# Patient Record
Sex: MALE | Race: WHITE
[De-identification: names, ages, dates, MRNs, and addresses within clinical notes are randomized per-mention and may not be internally consistent; named-entity substitution may affect disease eponyms.]

---

## 2018-10-25 ENCOUNTER — HOSPITAL ENCOUNTER (OUTPATIENT)
Dept: HOSPITAL 91 - GIL | Age: 53
Discharge: HOME | End: 2018-10-25
Payer: COMMERCIAL

## 2018-10-25 ENCOUNTER — HOSPITAL ENCOUNTER (OUTPATIENT)
Age: 53
Discharge: HOME | End: 2018-10-25

## 2018-10-25 DIAGNOSIS — D12.6: ICD-10-CM

## 2018-10-25 DIAGNOSIS — Z12.11: Primary | ICD-10-CM

## 2018-10-25 PROCEDURE — 45380 COLONOSCOPY AND BIOPSY: CPT

## 2018-10-25 PROCEDURE — 88305 TISSUE EXAM BY PATHOLOGIST: CPT

## 2019-04-10 ENCOUNTER — HOSPITAL ENCOUNTER (INPATIENT)
Dept: HOSPITAL 10 - E/R | Age: 54
LOS: 3 days | Discharge: HOME | DRG: 101 | End: 2019-04-13
Attending: INTERNAL MEDICINE | Admitting: INTERNAL MEDICINE
Payer: COMMERCIAL

## 2019-04-10 ENCOUNTER — HOSPITAL ENCOUNTER (INPATIENT)
Dept: HOSPITAL 91 - 6WM | Age: 54
LOS: 3 days | Discharge: HOME | DRG: 101 | End: 2019-04-13
Payer: COMMERCIAL

## 2019-04-10 VITALS
WEIGHT: 173.72 LBS | HEIGHT: 72 IN | BODY MASS INDEX: 23.53 KG/M2 | BODY MASS INDEX: 23.53 KG/M2 | HEIGHT: 72 IN | WEIGHT: 173.72 LBS

## 2019-04-10 VITALS — HEART RATE: 89 BPM | SYSTOLIC BLOOD PRESSURE: 141 MMHG | DIASTOLIC BLOOD PRESSURE: 89 MMHG | RESPIRATION RATE: 19 BRPM

## 2019-04-10 VITALS — RESPIRATION RATE: 18 BRPM | HEART RATE: 92 BPM | DIASTOLIC BLOOD PRESSURE: 82 MMHG | SYSTOLIC BLOOD PRESSURE: 158 MMHG

## 2019-04-10 VITALS — DIASTOLIC BLOOD PRESSURE: 86 MMHG | SYSTOLIC BLOOD PRESSURE: 158 MMHG | HEART RATE: 86 BPM | RESPIRATION RATE: 18 BRPM

## 2019-04-10 DIAGNOSIS — F14.90: ICD-10-CM

## 2019-04-10 DIAGNOSIS — Z99.3: ICD-10-CM

## 2019-04-10 DIAGNOSIS — F51.04: ICD-10-CM

## 2019-04-10 DIAGNOSIS — G47.00: ICD-10-CM

## 2019-04-10 DIAGNOSIS — I69.954: ICD-10-CM

## 2019-04-10 DIAGNOSIS — F39: ICD-10-CM

## 2019-04-10 DIAGNOSIS — F41.9: ICD-10-CM

## 2019-04-10 DIAGNOSIS — J32.9: ICD-10-CM

## 2019-04-10 DIAGNOSIS — G93.49: ICD-10-CM

## 2019-04-10 DIAGNOSIS — G40.909: Primary | ICD-10-CM

## 2019-04-10 DIAGNOSIS — K50.90: ICD-10-CM

## 2019-04-10 LAB
ADD MAN DIFF?: NO
ADD UMIC: YES
ALANINE AMINOTRANSFERASE: 16 IU/L (ref 13–69)
ALBUMIN/GLOBULIN RATIO: 1.22
ALBUMIN: 4.3 G/DL (ref 3.3–4.9)
ALKALINE PHOSPHATASE: 159 IU/L (ref 42–121)
ANION GAP: 12 (ref 5–13)
ANION GAP: 14 (ref 5–13)
ASPARTATE AMINO TRANSFERASE: 20 IU/L (ref 15–46)
BASOPHIL #: 0 10^3/UL (ref 0–0.1)
BASOPHILS %: 0.2 % (ref 0–2)
BILIRUBIN,DIRECT: 0 MG/DL (ref 0–0.2)
BILIRUBIN,TOTAL: 0.5 MG/DL (ref 0.2–1.3)
BLOOD UREA NITROGEN: 11 MG/DL (ref 7–20)
BLOOD UREA NITROGEN: 12 MG/DL (ref 7–20)
C-REACTIVE PROTEIN: 1.4 MG/DL (ref 0–0.9)
CALCIUM: 10.1 MG/DL (ref 8.4–10.2)
CALCIUM: 9.3 MG/DL (ref 8.4–10.2)
CARBON DIOXIDE: 25 MMOL/L (ref 21–31)
CARBON DIOXIDE: 26 MMOL/L (ref 21–31)
CHLORIDE: 104 MMOL/L (ref 97–110)
CHLORIDE: 104 MMOL/L (ref 97–110)
CREATININE: 0.8 MG/DL (ref 0.61–1.24)
CREATININE: 0.84 MG/DL (ref 0.61–1.24)
EOSINOPHILS #: 0 10^3/UL (ref 0–0.5)
EOSINOPHILS %: 0.1 % (ref 0–7)
GLOBULIN: 3.5 G/DL (ref 1.3–3.2)
GLUCOSE: 106 MG/DL (ref 70–220)
GLUCOSE: 106 MG/DL (ref 70–220)
HEMATOCRIT: 55.4 % (ref 42–52)
HEMOGLOBIN: 18.3 G/DL (ref 14–18)
IMMATURE GRANS #M: 0.03 10^3/UL (ref 0–0.03)
IMMATURE GRANS % (M): 0.3 % (ref 0–0.43)
INR: 1
LACTIC ACID: 0.9 MMOL/L (ref 0.5–2)
LACTIC ACID: 1.6 MMOL/L (ref 0.5–2)
LIPASE: 232 U/L (ref 23–300)
LYMPHOCYTES #: 0.8 10^3/UL (ref 0.8–2.9)
LYMPHOCYTES %: 7.7 % (ref 15–51)
MEAN CORPUSCULAR HEMOGLOBIN: 31.3 PG (ref 29–33)
MEAN CORPUSCULAR HGB CONC: 33 G/DL (ref 32–37)
MEAN CORPUSCULAR VOLUME: 94.7 FL (ref 82–101)
MEAN PLATELET VOLUME: 9.4 FL (ref 7.4–10.4)
MONOCYTE #: 0.6 10^3/UL (ref 0.3–0.9)
MONOCYTES %: 6.4 % (ref 0–11)
NEUTROPHIL #: 8.6 10^3/UL (ref 1.6–7.5)
NEUTROPHILS %: 85.3 % (ref 39–77)
NUCLEATED RED BLOOD CELLS #: 0 10^3/UL (ref 0–0)
NUCLEATED RED BLOOD CELLS%: 0 /100WBC (ref 0–0)
PARTIAL THROMBOPLASTIN TIME: 28.2 SEC (ref 23–35)
PLATELET COUNT: 270 10^3/UL (ref 140–415)
POTASSIUM: 3.8 MMOL/L (ref 3.5–5.1)
POTASSIUM: 4.5 MMOL/L (ref 3.5–5.1)
PROTIME: 13.3 SEC (ref 11.9–14.9)
PT RATIO: 1
RED BLOOD COUNT: 5.85 10^6/UL (ref 4.7–6.1)
RED CELL DISTRIBUTION WIDTH: 12.3 % (ref 11.5–14.5)
SODIUM: 141 MMOL/L (ref 135–144)
SODIUM: 144 MMOL/L (ref 135–144)
TOTAL PROTEIN: 7.8 G/DL (ref 6.1–8.1)
UR ASCORBIC ACID: NEGATIVE MG/DL
UR BILIRUBIN (DIP): NEGATIVE MG/DL
UR BLOOD (DIP): (no result) MG/DL
UR CLARITY: (no result)
UR COLOR: YELLOW
UR GLUCOSE (DIP): NEGATIVE MG/DL
UR KETONES (DIP): (no result) MG/DL
UR LEUKOCYTE ESTERASE (DIP): NEGATIVE LEU/UL
UR MUCUS: (no result) /HPF
UR NITRITE (DIP): NEGATIVE MG/DL
UR PH (DIP): 5 (ref 5–9)
UR RBC: > 182 /HPF (ref 0–5)
UR SPECIFIC GRAVITY (DIP): 1.02 (ref 1–1.03)
UR TOTAL PROTEIN (DIP): (no result) MG/DL
UR UROBILINOGEN (DIP): NEGATIVE MG/DL
UR WBC: 5 /HPF (ref 0–5)
WHITE BLOOD COUNT: 10 10^3/UL (ref 4.8–10.8)

## 2019-04-10 PROCEDURE — 80053 COMPREHEN METABOLIC PANEL: CPT

## 2019-04-10 PROCEDURE — 87081 CULTURE SCREEN ONLY: CPT

## 2019-04-10 PROCEDURE — 96365 THER/PROPH/DIAG IV INF INIT: CPT

## 2019-04-10 PROCEDURE — 81001 URINALYSIS AUTO W/SCOPE: CPT

## 2019-04-10 PROCEDURE — 83690 ASSAY OF LIPASE: CPT

## 2019-04-10 PROCEDURE — 85730 THROMBOPLASTIN TIME PARTIAL: CPT

## 2019-04-10 PROCEDURE — 96367 TX/PROPH/DG ADDL SEQ IV INF: CPT

## 2019-04-10 PROCEDURE — 70551 MRI BRAIN STEM W/O DYE: CPT

## 2019-04-10 PROCEDURE — 84100 ASSAY OF PHOSPHORUS: CPT

## 2019-04-10 PROCEDURE — 80307 DRUG TEST PRSMV CHEM ANLYZR: CPT

## 2019-04-10 PROCEDURE — 82550 ASSAY OF CK (CPK): CPT

## 2019-04-10 PROCEDURE — 82962 GLUCOSE BLOOD TEST: CPT

## 2019-04-10 PROCEDURE — 96375 TX/PRO/DX INJ NEW DRUG ADDON: CPT

## 2019-04-10 PROCEDURE — 85025 COMPLETE CBC W/AUTO DIFF WBC: CPT

## 2019-04-10 PROCEDURE — 83036 HEMOGLOBIN GLYCOSYLATED A1C: CPT

## 2019-04-10 PROCEDURE — 87086 URINE CULTURE/COLONY COUNT: CPT

## 2019-04-10 PROCEDURE — 84439 ASSAY OF FREE THYROXINE: CPT

## 2019-04-10 PROCEDURE — 83605 ASSAY OF LACTIC ACID: CPT

## 2019-04-10 PROCEDURE — 85610 PROTHROMBIN TIME: CPT

## 2019-04-10 PROCEDURE — 87400 INFLUENZA A/B EACH AG IA: CPT

## 2019-04-10 PROCEDURE — 80048 BASIC METABOLIC PNL TOTAL CA: CPT

## 2019-04-10 PROCEDURE — 86140 C-REACTIVE PROTEIN: CPT

## 2019-04-10 PROCEDURE — 80061 LIPID PANEL: CPT

## 2019-04-10 PROCEDURE — 93306 TTE W/DOPPLER COMPLETE: CPT

## 2019-04-10 PROCEDURE — 83735 ASSAY OF MAGNESIUM: CPT

## 2019-04-10 PROCEDURE — 92610 EVALUATE SWALLOWING FUNCTION: CPT

## 2019-04-10 PROCEDURE — 36415 COLL VENOUS BLD VENIPUNCTURE: CPT

## 2019-04-10 PROCEDURE — 70450 CT HEAD/BRAIN W/O DYE: CPT

## 2019-04-10 PROCEDURE — 84443 ASSAY THYROID STIM HORMONE: CPT

## 2019-04-10 PROCEDURE — 99285 EMERGENCY DEPT VISIT HI MDM: CPT

## 2019-04-10 PROCEDURE — 87040 BLOOD CULTURE FOR BACTERIA: CPT

## 2019-04-10 PROCEDURE — 71045 X-RAY EXAM CHEST 1 VIEW: CPT

## 2019-04-10 PROCEDURE — 95819 EEG AWAKE AND ASLEEP: CPT

## 2019-04-10 PROCEDURE — 93880 EXTRACRANIAL BILAT STUDY: CPT

## 2019-04-10 RX ADMIN — LORAZEPAM 1 MG: 2 INJECTION, SOLUTION INTRAMUSCULAR; INTRAVENOUS at 20:45

## 2019-04-10 RX ADMIN — CEFEPIME 1 MLS/HR: 1 INJECTION, POWDER, FOR SOLUTION INTRAMUSCULAR; INTRAVENOUS at 17:14

## 2019-04-10 RX ADMIN — LORAZEPAM 1 MG: 2 INJECTION, SOLUTION INTRAMUSCULAR; INTRAVENOUS at 15:59

## 2019-04-10 RX ADMIN — CEFEPIME 1 MLS/HR: 1 INJECTION, POWDER, FOR SOLUTION INTRAMUSCULAR; INTRAVENOUS at 20:21

## 2019-04-10 RX ADMIN — VANCOMYCIN HYDROCHLORIDE 1 MLS/HR: 1 INJECTION, POWDER, LYOPHILIZED, FOR SOLUTION INTRAVENOUS at 17:47

## 2019-04-10 RX ADMIN — LEVETIRACETAM SCH MLS/HR: 10 INJECTION INTRAVENOUS at 20:21

## 2019-04-10 RX ADMIN — THIAMINE HYDROCHLORIDE 1 ML: 100 INJECTION, SOLUTION INTRAMUSCULAR; INTRAVENOUS at 17:13

## 2019-04-10 RX ADMIN — ACETAMINOPHEN 1 MG: 325 TABLET, FILM COATED ORAL at 20:11

## 2019-04-10 RX ADMIN — VANCOMYCIN HYDROCHLORIDE 1 MLS/HR: 500 INJECTION, POWDER, LYOPHILIZED, FOR SOLUTION INTRAVENOUS at 22:59

## 2019-04-10 RX ADMIN — LEVETIRACETAM 1 MLS/HR: 10 INJECTION INTRAVENOUS at 20:21

## 2019-04-10 RX ADMIN — IBUPROFEN 1 MG: 600 TABLET ORAL at 17:14

## 2019-04-10 RX ADMIN — CEFEPIME SCH MLS/HR: 1 INJECTION, POWDER, FOR SOLUTION INTRAMUSCULAR; INTRAVENOUS at 20:21

## 2019-04-10 RX ADMIN — LEVETIRACETAM 1 MLS/HR: 10 INJECTION INTRAVENOUS at 13:06

## 2019-04-10 RX ADMIN — THIAMINE HYDROCHLORIDE 1 MLS/HR: 100 INJECTION, SOLUTION INTRAMUSCULAR; INTRAVENOUS at 20:10

## 2019-04-10 RX ADMIN — ENALAPRILAT 1 MG: 1.25 INJECTION INTRAVENOUS at 14:46

## 2019-04-10 RX ADMIN — FOLIC ACID SCH MLS/HR: 5 INJECTION, SOLUTION INTRAMUSCULAR; INTRAVENOUS; SUBCUTANEOUS at 20:10

## 2019-04-10 NOTE — HP
Date/Time of Note


Date/Time of Note


DATE: 4/10/19 


TIME: 18:30





Assessment/Plan


VTE Prophylaxis


Pharmacological prophylaxis:  other





Lines/Catheters


IV Catheter Type (from [facility]):  Saline Lock





Assessment/Plan


Hospital Course


Patient is a  male with a past medical history significant for CVA with


residual left-sided deficit, Crohn's, being wheelchair-bound who presents to 


[facility] after being sent by his assisted living facility 


after being found down, altered, with his tongue cut and mild bleeding from the 


mouth.  Patient originally was assessed by ER physician diagnosed with likely 


new onset seizure secondary to his prior CVA and was about to be discharged when


lactic acid was elevated as well as a fever.  Currently patient is not 


consistent with what the assisted living facility states, according to living 


facility, patient is alert and oriented and able to take care of himself for the


most part, he does have a left-sided deficit but able to we will him  self it is


wheelchair, it is unknown at this time if the left-sided deficit included a mild


left upper arm deficit and not just the leg.  Patient denies any history of 


seizure and he is able to answer some questions somewhat.  Patient cannot keep 


his concentration for quite some time.  Patient cannot accurately explain what 


exactly happened according to assisted living facility staff last known well 


time was last night.  Patient currently denies any chest pain, shortness of 


breath however HPI is limited as patient is still mildly altered.





Objective





Physical exam





General: Patient is laying in bed and answers questions very slowly and 


intermittently


Mentation: Patient is alert and oriented to self


Head: Normocephalic atraumatic


Eyes: EOMI, pupils reactive to light


Neck: Supple, nontender, midline


Respiratory: Clear to auscultation bilaterally


Cardiovascular: regular rate, no obvious murmurs


Gastrointestinal: non-tender to palpation, bowel sounds heard.


Neurological: Left upper extremity is contracted however is easily straightened 


but returns to the contracted state, left lower extremity has minimal movement 


but there is some movement in the toes with sensation.  Right upper extremity 


and right lower extremity have full movement and sensation


Skin: No new skin lesions,





Assessment and plan





Acute encephalopathy


-The differential is extremely wide as there appears to be limited history, 


seizure versus CVA versus dehydration versus UTI as UA has not been collected 


versus other infectious sources


-Patient's hemoglobin is consistent with volume contraction, give IV fluids


-IV antibiotics broad-spectrum for fever and lactic acid elevation


-CT negative for acute CVA, MRI pending


-Keppra on board for possible seizure with Ativan as needed, neurology 


consulted,


-Blood cultures pending


-UA pending


-Drug screen pending


-EEG pending





Left upper extremity and left lower extremity weakness and contraction of the 


upper extremity


-According to staff at assisted living facility, patient does have a left-sided 


deficit however it is unknown at this time if the left-sided deficit includes 


the upper extremity, it is known patient is in wheelchair and is able to we will


him self around so doubt left upper extremity contracture is chronic however it 


may also have a possible chronic nature as patient's only medication from the 


assisted living facility was baclofen.


-We will need to speak with patient more once he is less encephalopathic


-CT negative for acute CVA


-MRI pending





Syncope


-Patient found down by staff at living facility,, unknown true downtime


-CT negative for acute issues, old CVA, MRI pending


-Echocardiogram


-Carotid ultrasound





Sepsis, unknown source, possible UTI


-Broad-spectrum antibiotic


-Blood cultures


-IV fluids





Elevated hemoglobin


-Possible volume contraction due to volume depletion


-Monitor labs in a.m.





Questionable seizure


-This may be the culprit however unknown


-Neurology consulted


-Continue Keppra for now


-Patient was not on Keppra at the assisted living facility





Disposition


-There are many unknown and uncertain questions at this time, will approach 


everything in a broad fashion and narrow down treatment as more information 


arises.  Monitor closely on telemetry, patient is n.p.o. at this time as his 


mentation is not completely certain.


Result Diagram:  


4/10/19 1055                                                                    


           4/10/19 1055





Results 24hrs





Laboratory Tests


Test
                                4/10/19
10:55  4/10/19
10:57  4/10/19
17:04


White Blood Count                           10.0


Red Blood Count                             5.85


Hemoglobin                                 18.3  H


Hematocrit                                 55.4  H


Mean Corpuscular Volume                     94.7


Mean Corpuscular Hemoglobin                 31.3


Mean Corpuscular Hemoglobin
Concent        33.0  
  
              



Red Cell Distribution Width                 12.3


Platelet Count                               270


Mean Platelet Volume                         9.4


Immature Granulocytes %                    0.300


Neutrophils %                              85.3  H


Lymphocytes %                               7.7  L


Monocytes %                                  6.4


Eosinophils %                                0.1


Basophils %                                  0.2


Nucleated Red Blood Cells %                  0.0


Immature Granulocytes #                    0.030


Neutrophils #                               8.6  H


Lymphocytes #                                0.8


Monocytes #                                  0.6


Eosinophils #                                0.0


Basophils #                                  0.0


Nucleated Red Blood Cells #                  0.0


Sodium Level                                 144


Potassium Level                              3.8


Chloride Level                               104


Carbon Dioxide Level                          26


Anion Gap                                    14  H


Blood Urea Nitrogen                           12


Creatinine                                  0.84


Est Glomerular Filtrat Rate
mL/min   > 60  
        
              



Glucose Level                                106


Calcium Level                               10.1


Bedside Glucose                                              98


POC Venous Lactate                                                       2.1  *H








HPI/ROS


Admit Date/Time


Admit Date/Time








PMH/Family/Social


Past Medical History


Medications





Current Medications


Vancomycin HCl 250 ml @  125 mls/hr ONCE  ONCE IVPB ;  Start 4/10/19 at 17:00;  


Stop 4/10/19 at 18:59


Coded Allergies:  


     Iodine and Iodide Containing Produc (Verified  Allergy, Severe, HIVES, 


RASH, ITCHING, 4/10/19)





Social History


Smoking Status:  Never smoker





Exam/Review of Systems


Vital Signs


Vitals





Vital Signs


  Date      Temp   Pulse  Resp  B/P (MAP)   Pulse Ox  O2         O2 Flow    FiO2


Time                                                  Delivery   Rate


   4/10/19  100.7


     17:14


   4/10/19           102    18      160/96        95  Room Air


     16:15                           (117)














CARMENCITA CHILEL                    Apr 10, 2019 18:30

## 2019-04-10 NOTE — ERD
ER Documentation


Chief Complaint


Chief Complaint





JOSÉ GOMEZ OF AL. ORAL TRAUMA. NON VERBAL





HPI


53-year-old male transferred to the emergency department by paramedics after a 


loss of consciousness episode.  Patient is a poor historian with limited insight


into his presentation.


According to the paramedics, there was a brief loss of consciousness episode.  


There is no witnessed seizure activity, but the patient had oral trauma and 


incontinence.  He appeared to be postictal and was transported to the emergency 


department.  There is no history of headache or any other symptoms prior to the 


event.





I have reviewed the paramedic pre-hospital care.


Pre-hospital vital signs were reviewed. 


Pre-hospital diagnostic tests were reviewed.





Upon arrival, patient is unable to provide any further history.





ROS


All systems reviewed and are negative except as per history of present illness.





Medications


Home Meds


Active Scripts


Levetiracetam* (Keppra*) 500 Mg Tablet, 500 MG PO BID, #30 TAB


   Prov:BRAYDEN ARROYO         4/10/19


Reported Medications


[Clonazepam]   No Conflict Check


   10/25/18


[Baclofen]   No Conflict Check


   10/25/18





Allergies


Allergies:  


Coded Allergies:  


     Iodine and Iodide Containing Produc (Verified  Allergy, Severe, HIVES, 


RASH, ITCHING, 10/25/18)





PMhx/Soc


History of Surgery:  Yes (SB RESECTION)


Anesthesia Reaction:  No


Hx Neurological Disorder:  Yes (CVA WITH LEFT HEMIPARESIS)


Hx Respiratory Disorders:  No


Hx Cardiac Disorders:  No


Hx Psychiatric Problems:  Yes (INSOMNIA, anxiety)


Hx Miscellaneous Medical Probl:  No (Chron's Disease)


Hx Alcohol Use:  No


Hx Substance Use:  No


Hx Tobacco Use:  No


Smoking Status:  Never smoker





Physical Exam


Vitals





Vital Signs


  Date      Temp  Pulse  Resp  B/P (MAP)   Pulse Ox  O2          O2 Flow    FiO2


Time                                                 Delivery    Rate


   4/10/19  97.8     89    16     150/100        99


     10:38                          (117)





Physical Exam


GENERAL: Chronically ill-appearing L in no distress


HEENT: Pupils equal, round, and reactive to light.  EOMI. There is no scleral 


icterus.  Oral trauma is noted


NECK: C-spine is soft and supple, there is no meningismus.  There is no cervical


 lymphadenopathy.


LUNGS: Clear to auscultation bilaterally. There are no rales, wheezes or 


rhonchi.


HEART: Regular rate and rhythm, no murmurs, clicks, rubs or gallops.


ABDOMEN: Soft, non-tender, non-distended.  There are bowel sounds in all four 


quadrants. No rebound or guarding.


EXTREMITIES: No edema, cyanosis or clubbing


NEURO:   Patient is awake but postictal.  He is slow to respond with limited 


insight.  Pupils are midrange, equal round and reactive to light.  Patient has 


left upper extremity and lower extremity weakness consistent with a previous 


stroke that he has had as per his records.


SKIN: There is no apparent rash or petechiae.


HEME/LYMPHATIC: There is no evidence of excessive bruising or lymphedema.


PSYCHIATRIC: The patient does not appear anxious or depressed.  Appears 


postictal


Result Diagram:  


4/10/19 1055                                                                    


            4/10/19 1055





Results 24 hrs





Laboratory Tests


       Test
                                 4/10/19
10:55  4/10/19
10:57


       White Blood Count                     10.0 10^3/ul


       Red Blood Count                       5.85 10^6/ul


       Hemoglobin                               18.3 g/dl


       Hematocrit                                  55.4 %


       Mean Corpuscular Volume                    94.7 fl


       Mean Corpuscular Hemoglobin                31.3 pg


       Mean Corpuscular Hemoglobin
Concent     33.0 g/dl 
  



       Red Cell Distribution Width                 12.3 %


       Platelet Count                         270 10^3/UL


       Mean Platelet Volume                        9.4 fl


       Immature Granulocytes %                    0.300 %


       Neutrophils %                               85.3 %


       Lymphocytes %                                7.7 %


       Monocytes %                                  6.4 %


       Eosinophils %                                0.1 %


       Basophils %                                  0.2 %


       Nucleated Red Blood Cells %            0.0 /100WBC


       Immature Granulocytes #              0.030 10^3/ul


       Neutrophils #                          8.6 10^3/ul


       Lymphocytes #                          0.8 10^3/ul


       Monocytes #                            0.6 10^3/ul


       Eosinophils #                          0.0 10^3/ul


       Basophils #                            0.0 10^3/ul


       Nucleated Red Blood Cells #            0.0 10^3/ul


       Sodium Level                            144 mmol/L


       Potassium Level                         3.8 mmol/L


       Chloride Level                          104 mmol/L


       Carbon Dioxide Level                     26 mmol/L


       Anion Gap                                       14


       Blood Urea Nitrogen                       12 mg/dl


       Creatinine                              0.84 mg/dl


       Est Glomerular Filtrat Rate
mL/min   > 60 mL/min 
   



       Glucose Level                            106 mg/dl


       Calcium Level                           10.1 mg/dl


       Bedside Glucose                                          98 mg/dL





Current Medications


 Medications
   Dose
          Sig/Kim
       Start Time
   Status  Last


 (Trade)       Ordered        Route
 PRN     Stop Time              Admin
Dose


                              Reason                                Admin


                100 ml @ 
     ONCE  ONCE
    4/10/19                



Levetiracetam  400 mls/hr     IVPB
          13:00



                                             4/10/19 13:14








Procedures/MDM


Patient was taken to a room, seen and evaluated. Comfort measures were 


initiated. 





Diagnostic tests were ordered and reviewed.





3 LEAD RHYTHM STRIP: Normal sinus rhythm without ectopy





RADIOLOGY: Reviewed with the radiologist





REEVALUATION: 1300: Upon reevaluation, patient is now more awake but with no 


significant insight.  He continues to have his left upper extremity weakness 


consistent with his previous stroke as per his documentation from his care 


facility.  There are no new neurologic symptoms appreciated.





MEDICAL DECISION MAKIN-year-old male presents after what appears to be a 


seizure.  He does have a history of a previous stroke which would be the focus 


for the seizure.  At this time, he appears to be back to his baseline neurologic


 state with no further seizure activity after an observation time of 


approximately 1 hour.  Overall, patient otherwise appears to be stable with no 


other indications of other high-risk concerns and seems appropriate for 


discharge.





Departure


Diagnosis:  


   Primary Impression:  


   Seizure


Condition:  Stable


Patient Instructions:  Seizure, New Onset, Unk Cause [Adult]





Additional Instructions:  


See your doctor for follow-up as discussed.  Take a copy of your test results, 


if appropriate, to this follow-up visit.





See your doctor or return here if your symptoms do not improve as expected.





At any time, please return to the emergency department for any change or 


worsening in her symptoms.











BRAYDEN ARROYO                Apr 10, 2019 13:02

## 2019-04-11 VITALS — SYSTOLIC BLOOD PRESSURE: 142 MMHG | RESPIRATION RATE: 18 BRPM | DIASTOLIC BLOOD PRESSURE: 83 MMHG | HEART RATE: 89 BPM

## 2019-04-11 VITALS — HEART RATE: 99 BPM

## 2019-04-11 VITALS — HEART RATE: 80 BPM | SYSTOLIC BLOOD PRESSURE: 139 MMHG | RESPIRATION RATE: 20 BRPM | DIASTOLIC BLOOD PRESSURE: 81 MMHG

## 2019-04-11 VITALS — RESPIRATION RATE: 22 BRPM | HEART RATE: 82 BPM | DIASTOLIC BLOOD PRESSURE: 92 MMHG | SYSTOLIC BLOOD PRESSURE: 149 MMHG

## 2019-04-11 VITALS — RESPIRATION RATE: 18 BRPM | HEART RATE: 81 BPM | DIASTOLIC BLOOD PRESSURE: 69 MMHG | SYSTOLIC BLOOD PRESSURE: 110 MMHG

## 2019-04-11 VITALS — HEART RATE: 96 BPM | DIASTOLIC BLOOD PRESSURE: 77 MMHG | RESPIRATION RATE: 18 BRPM | SYSTOLIC BLOOD PRESSURE: 110 MMHG

## 2019-04-11 VITALS — HEART RATE: 81 BPM

## 2019-04-11 VITALS — HEART RATE: 84 BPM

## 2019-04-11 VITALS — HEART RATE: 83 BPM | SYSTOLIC BLOOD PRESSURE: 118 MMHG | RESPIRATION RATE: 18 BRPM | DIASTOLIC BLOOD PRESSURE: 71 MMHG

## 2019-04-11 VITALS — HEART RATE: 89 BPM

## 2019-04-11 VITALS — HEART RATE: 77 BPM

## 2019-04-11 VITALS — SYSTOLIC BLOOD PRESSURE: 128 MMHG | HEART RATE: 81 BPM | DIASTOLIC BLOOD PRESSURE: 73 MMHG | RESPIRATION RATE: 18 BRPM

## 2019-04-11 VITALS — HEART RATE: 86 BPM

## 2019-04-11 LAB
ADD MAN DIFF?: NO
ALANINE AMINOTRANSFERASE: 17 IU/L (ref 13–69)
ALBUMIN/GLOBULIN RATIO: 1.2
ALBUMIN: 3.6 G/DL (ref 3.3–4.9)
ALKALINE PHOSPHATASE: 131 IU/L (ref 42–121)
ANION GAP: 8 (ref 5–13)
ASPARTATE AMINO TRANSFERASE: 18 IU/L (ref 15–46)
BASOPHIL #: 0 10^3/UL (ref 0–0.1)
BASOPHILS %: 0.4 % (ref 0–2)
BILIRUBIN,DIRECT: 0 MG/DL (ref 0–0.2)
BILIRUBIN,TOTAL: 0.8 MG/DL (ref 0.2–1.3)
BLOOD UREA NITROGEN: 13 MG/DL (ref 7–20)
CALCIUM: 8.8 MG/DL (ref 8.4–10.2)
CARBON DIOXIDE: 27 MMOL/L (ref 21–31)
CHLORIDE: 105 MMOL/L (ref 97–110)
CHOL/HDL RATIO: 2.6 RATIO
CHOLESTEROL: 93 MG/DL (ref 100–200)
CREATININE: 0.76 MG/DL (ref 0.61–1.24)
EOSINOPHILS #: 0 10^3/UL (ref 0–0.5)
EOSINOPHILS %: 0.3 % (ref 0–7)
FREE T4 (FREE THYROXINE): 0.84 NG/DL (ref 0.64–1.79)
GLOBULIN: 3 G/DL (ref 1.3–3.2)
GLUCOSE: 88 MG/DL (ref 70–220)
HDL CHOLESTEROL: 35 MG/DL (ref 28–71)
HEMATOCRIT: 44.4 % (ref 42–52)
HEMOGLOBIN A1C: 5 % (ref 0–5.9)
HEMOGLOBIN: 14.6 G/DL (ref 14–18)
IMMATURE GRANS #M: 0.02 10^3/UL (ref 0–0.03)
IMMATURE GRANS % (M): 0.3 % (ref 0–0.43)
LDL CHOLESTEROL,CALCULATED: 40 MG/DL
LYMPHOCYTES #: 1.4 10^3/UL (ref 0.8–2.9)
LYMPHOCYTES %: 19.3 % (ref 15–51)
MAGNESIUM: 2.2 MG/DL (ref 1.7–2.5)
MEAN CORPUSCULAR HEMOGLOBIN: 31.5 PG (ref 29–33)
MEAN CORPUSCULAR HGB CONC: 32.9 G/DL (ref 32–37)
MEAN CORPUSCULAR VOLUME: 95.7 FL (ref 82–101)
MEAN PLATELET VOLUME: 9.6 FL (ref 7.4–10.4)
MONOCYTE #: 0.8 10^3/UL (ref 0.3–0.9)
MONOCYTES %: 11.2 % (ref 0–11)
NEUTROPHIL #: 4.9 10^3/UL (ref 1.6–7.5)
NEUTROPHILS %: 68.5 % (ref 39–77)
NUCLEATED RED BLOOD CELLS #: 0 10^3/UL (ref 0–0)
NUCLEATED RED BLOOD CELLS%: 0 /100WBC (ref 0–0)
PLATELET COUNT: 220 10^3/UL (ref 140–415)
POTASSIUM: 4 MMOL/L (ref 3.5–5.1)
RED BLOOD COUNT: 4.64 10^6/UL (ref 4.7–6.1)
RED CELL DISTRIBUTION WIDTH: 12.5 % (ref 11.5–14.5)
SODIUM: 140 MMOL/L (ref 135–144)
THYROID STIMULATING HORMONE: 0.15 MIU/L (ref 0.47–4.68)
TOTAL PROTEIN: 6.6 G/DL (ref 6.1–8.1)
TRIGLYCERIDES: 91 MG/DL (ref 0–149)
WHITE BLOOD COUNT: 7.2 10^3/UL (ref 4.8–10.8)

## 2019-04-11 RX ADMIN — VANCOMYCIN HYDROCHLORIDE 1 MLS/HR: 500 INJECTION, POWDER, LYOPHILIZED, FOR SOLUTION INTRAVENOUS at 08:05

## 2019-04-11 RX ADMIN — LORAZEPAM 1 MG: 1 TABLET ORAL at 21:07

## 2019-04-11 RX ADMIN — CEFEPIME 1 MLS/HR: 1 INJECTION, POWDER, FOR SOLUTION INTRAMUSCULAR; INTRAVENOUS at 08:05

## 2019-04-11 RX ADMIN — OXCARBAZEPINE SCH MG: 300 TABLET, FILM COATED ORAL at 21:06

## 2019-04-11 RX ADMIN — LEVETIRACETAM 1 MLS/HR: 10 INJECTION INTRAVENOUS at 09:11

## 2019-04-11 RX ADMIN — THIAMINE HYDROCHLORIDE 1 MLS/HR: 100 INJECTION, SOLUTION INTRAMUSCULAR; INTRAVENOUS at 21:10

## 2019-04-11 RX ADMIN — ACETAMINOPHEN 1 MG: 325 TABLET, FILM COATED ORAL at 23:27

## 2019-04-11 RX ADMIN — FOLIC ACID SCH MLS/HR: 5 INJECTION, SOLUTION INTRAMUSCULAR; INTRAVENOUS; SUBCUTANEOUS at 21:10

## 2019-04-11 RX ADMIN — OXCARBAZEPINE 1 MG: 300 TABLET, FILM COATED ORAL at 21:06

## 2019-04-11 RX ADMIN — FOLIC ACID SCH MLS/HR: 5 INJECTION, SOLUTION INTRAMUSCULAR; INTRAVENOUS; SUBCUTANEOUS at 06:58

## 2019-04-11 RX ADMIN — CEFEPIME SCH MLS/HR: 1 INJECTION, POWDER, FOR SOLUTION INTRAMUSCULAR; INTRAVENOUS at 08:05

## 2019-04-11 RX ADMIN — LEVETIRACETAM SCH MLS/HR: 10 INJECTION INTRAVENOUS at 09:11

## 2019-04-11 RX ADMIN — THIAMINE HYDROCHLORIDE 1 MLS/HR: 100 INJECTION, SOLUTION INTRAMUSCULAR; INTRAVENOUS at 06:58

## 2019-04-11 NOTE — RADRPT
Echocardiogram Report

 

Patient Name: CASH VICKERSatient ID: [id number]

: 1965 (54y )Study Date: 2019 7:44:55 AM

Gender: MAccession #: [id number]

Tech: ANDERAroldo Wolfe [facility]                   Location: [facility]         

Ref.Physician: CARMENCITA CHILEL            Height(Cm):            

 

BSA: Weight(Kg):

Quality: AdequateAccount #:

 

 

Procedures:

 

Echocardiographic Report:

Transthoracic echocardiogram with complete 2D, M-Mode, and doppler 

examination.

 

Indications:

 

Syncope.

 

 

Measurements:

2D/M Mode                                          Doppler                                           
    

Measurement    Value    Normal Range               Measurement      Value    Normal Range            
    

LVIDd 2D       4.4      [ 4.2 - 5.8 ] cm           AV Peak Fei      1.1      [ 100.0 - 170.0 ] cm/sec
    

LVIDs 2D       3.1      [ 2.5 - 4.0 ] cm           AV Peak PG       5.0      [ 2.0 - 9.0 ] mmHg      
    

LVPWd 2D       1.2      [ 0.6 - 1.0 ] cm           LVOT Peak Fei    0.8      [ 70.0 - 110.0 ] cm/sec 
    

IVSd 2D        1.3      [ 0.6 - 1.0 ] cm           LVOT Peak PG     3.0      [ 2.0 - 6.0 ] mmHg      
    

AoR Diam 2D    2.8      [ 2.6 - 3.4 ] cm           MV E Peak Fei    0.8      [ 60.0 - 130.0 ] cm/sec 
    

EDV 2D         86.3     [ 62.0 - 150.0 ] ml        MV A Peak Fei    0.6      [ 100.0 - 120.0 ] cm/sec
    

ESV 2D         37.0     [ 21.0 - 61.0 ] ml         MV E/A           1.3      [ 0.8 - 1.5 ] ratio     
    

EF 2D          57.1     [ 52.0 - 72.0 ] percent    MV Decel Time    144      [ 104 - 258 ] msec      
    

LA Dimen 2D    3.4      [ 3.0 - 4.0 ] cm           Lat E` Fei       0.1      [ 10.0 - 15.0 ] cm/sec  
    

                                                   Lateral E/E`     7.6      [ 1.0 - 2.0 ] ratio     
    

                                                   Med E` Fei       0.1      cm/sec                  
    

                                                   MV E/A           1.3      [ 0.8 - 1.5 ] ratio     
    

                                                   TR Peak Fei      2.3      [ 100.0 - 280.0 ] cm/sec
    

                                                   TR Peak PG       22.0     mmHg                    
    

                                                   RVSP             25.0     [ 10.0 - 36.0 ] mmHg    
    

                                                   RA Pressure      3.0      mmHg                    
    

 

Findings:

 

Left Ventricle:

Normal left ventricular systolic function. Normal left ventricular 

cavity size. Mild concentric left ventricular hypertrophy. Ejection 

fraction is visually estimated at 60-65 %. Tissue Doppler/Mitral Doppler 

indices are within normal limits.

 

Right Ventricle:

Normal right ventricular size. Normal right ventricular systolic 

function.

 

Left Atrium:

The left atrium is normal in size.

 

Right Atrium:

The right atrium is normal in size.

 

Mitral Valve:

Normal appearance and function of the mitral valve with trace 

physiologic regurgitation.

 

Aortic Valve:

Normal appearance of the aortic valve. No significant aortic stenosis or 

insufficiency.

 

Tricuspid Valve:

Normal appearance of the tricuspid valve. Estimated peak PA systolic 

pressure 25 mmHg. There is trace tricuspid regurgitation.

 

Pulmonic Valve:

Pulmonic valve not well visualized.

 

Pericardium:

Normal pericardium with no significant pericardial effusion.

 

Aorta:

Normal aortic root.

 

IVC:

Normal size and normal respiratory collapse consistent with normal right 

atrial pressure.

 

 

Conclusions:

 

Normal left ventricular systolic function. Normal left ventricular 

cavity size. Mild concentric left ventricular hypertrophy. Ejection 

fraction is visually estimated at 60-65 %. Tissue Doppler/Mitral Doppler 

indices are within normal limits.

 

Normal appearance and function of the mitral valve with trace 

physiologic regurgitation.

 

Normal appearance of the tricuspid valve. Estimated peak PA systolic 

pressure 25 mmHg. There is trace tricuspid regurgitation.

 

Electronically Signed By:

 

Antonio Hairston

2019 21:47:04 PDT

## 2019-04-11 NOTE — CONS
Assessment/Plan


Assessment/Plan


Hospital Course


52 yo M with reported Hx of ICH anxiety and other comorbidities who presents for


evaluation of ams... for which neurology is consulted. 





The clinical picture is concerning for seizure...his first of life...





MRI brain is notable for R frontal lobe encephalomalacia... a likely 


epileptogenic focus. 





UDS + cocaine








P:


Await EEG to further evaluate for epileptiform activity


Start Trileptal for seizure ppx. 


Hold Keppra for now, as it can exacerbate underlying psychiatric comorbidities


Ativan IV PRN seizure > 5 min or for cluster


Cont other medical management per primary


Will follow clinically





Consultation Date/Type/Reason


Admit Date/Time





Type of Consult


Neurology


Reason for Consultation


seizures


Requesting Provider:  CARMENCITA CHILEL


Date/Time of Note


DATE: 4/11/19 


TIME: 11:41





Hx of Present Illness


The pt is unable to contribute a thorough hx. 





He notes taking klonopin daily at night for sleeping. 





He denies a hx of seizures. 





It is additionally elsewhere noted:


Hospital Course


Patient is a  male with a past medical history significant for CVA with


residual left-sided deficit, Crohn's, being wheelchair-bound who presents to 


[facility] after being sent by his assisted living facility 


after being found down, altered, with his tongue cut and mild bleeding from the 


mouth.  Patient originally was assessed by ER physician diagnosed with likely 


new onset seizure secondary to his prior CVA and was about to be discharged when


lactic acid was elevated as well as a fever.  Currently patient is not consis


tent with what the assisted living facility states, according to living 


facility, patient is alert and oriented and able to take care of himself for the


most part, he does have a left-sided deficit but able to we will him  self it is


wheelchair, it is unknown at this time if the left-sided deficit included a mild


left upper arm deficit and not just the leg.  Patient denies any history of 


seizure and he is able to answer some questions somewhat.  Patient cannot keep 


his concentration for quite some time.  Patient cannot accurately explain what 


exactly happened according to assisted living facility staff last known well 


time was last night.  Patient currently denies any chest pain, shortness of 


breath however HPI is limited as patient is still mildly altered.


negative unless noted otherwise in HPI





Exam/Review of Systems


Exam


Vitals





Vital Signs


  Date      Temp  Pulse  Resp  B/P (MAP)   Pulse Ox  O2          O2 Flow    FiO2


Time                                                 Delivery    Rate


   4/11/19           99


     08:10


   4/11/19  97.6           18      118/71        94  Room Air


     07:32                           (87)








Intake and Output





4/10/19


4/10/19


4/11/19





1515:00


23:00


07:00





IntakeIntake Total


100 ml


2050 ml


120 ml





OutputOutput Total


500 ml





BalanceBalance


100 ml


2050 ml


-380 ml











Exam


PE:


Gen Appearance: No Apparent Distress


HEENT: Normocephalic


Cardiovascular: Regular rate


Lungs: Clear bilaterally


Abdomen: Soft


Extremities: Dry





NE:


The patient was alert and grossly oriented. Language was dysarthric. Fund of 


knowledge was normal. Attention span limited.





Pupils were equal and reactive to light. There was no afferent pupillary defect.


Visual fields were normal. Funduscopic examination was limited. Extra-ocular 


movements were full. Ptosis was absent. There was no nystagmus. Facial sensation


was normal. Face was symmetric with normal strength. Hearing was intact. Palate 


movements were normal. Neck strength was normal. Tongue was swollen, though had 


a normal speed of movement.





Tone was normal. Muscle bulk was normal. I did not see fasciculations. Arms and 


legs were weak on the L.





Vibration sensation was normal. Temperature and pinprick sensation was normal.





Rapid alternating movements were normal. There was no dysmetria. There was no 


intention tremor. Gait was deferred due to bedrest.





Arm and leg reflexes were 2+ and symmetric. Golden's sign was absent. Plantar 


responses were flexor.





Results


Result Diagram:  


4/11/19 0548                                                                    


           4/11/19 0548





Results 24hrs





Laboratory Tests


Test
              4/10/19
17:04     4/10/19
17:06  4/10/19
19:45  4/10/19
22:13


POC Venous               2.1  *H


Lactate


Urine Color                       YELLOW


Urine Clarity
     
              SLIGHTLY
CLOUDY   
              



                                  A


Urine pH                                     5.0


Urine Specific                             1.017


Gravity


Urine Ketones                                2+  H


Urine Nitrite                     NEGATIVE


Urine Bilirubin                   NEGATIVE


Urine                             NEGATIVE


Urobilinogen


Urine Leukocyte                   NEGATIVE


Esterase


Urine Microscopic                 > 182  H


RBC


Urine Microscopic                              5


WBC


Urine Mucus                       FEW  A


Urine Hemoglobin                             3+  H


Urine Glucose                     NEGATIVE


Urine Total                                  1+  H


Protein


Urine Opiates                     Negative


Screen


Urine                             Negative


Barbiturates


Urine                             Negative


Amphetamines


Screen


Urine                             Negative


Benzodiazepines


Screen


Urine Cocaine                     Positive


Screen


Urine                             Negative


Cannabinoids


Prothrombin Time                                           13.3


Prothrombin Time                                            1.0


Ratio


INR International  
              
                       1.00  
  



Normalized
Ratio


Activated          
              
                       28.2  
  



Partial
Thrombopl


ast Time


Sodium Level                                                141


Potassium Level                                             4.5


Chloride Level                                              104


Carbon Dioxide                                               25


Level


Anion Gap                                                    12


Blood Urea                                                   11


Nitrogen


Creatinine                                                 0.80


Est Glomerular     
              
                 > 60  
        



Filtrat


Rate
mL/min


Glucose Level                                               106


Lactic Acid Level                                           1.6            0.9


Calcium Level                                               9.3


Total Bilirubin                                             0.5


Direct Bilirubin                                           0.00


Indirect                                                    0.5


Bilirubin


Aspartate Amino    
              
                         20  
  



Transf
(AST/SGOT)


Alanine            
              
                         16  
  



Aminotransferase



(ALT/SGPT)


Alkaline                                                   159  H


Phosphatase


C-Reactive                                                 1.4  H


Protein


Total Protein                                               7.8


Albumin                                                     4.3


Globulin                                                  3.50  H


Albumin/Globulin                                           1.22


Ratio


Lipase                                                      232


Test
              4/11/19
05:48  
                 
              



White Blood Count         7.2  #


Red Blood Count         4.64  #L


Hemoglobin               14.6  #


Hematocrit                44.4


Mean Corpuscular          95.7


Volume


Mean Corpuscular          31.5


Hemoglobin


Mean Corpuscular         32.9  
  
                 
              



Hemoglobin
Concen


t


Red Cell                  12.5


Distribution


Width


Platelet Count             220


Mean Platelet              9.6


Volume


Immature                 0.300


Granulocytes %


Neutrophils %             68.5


Lymphocytes %             19.3


Monocytes %              11.2  H


Eosinophils %              0.3


Basophils %                0.4


Nucleated Red              0.0


Blood Cells %


Immature                 0.020


Granulocytes #


Neutrophils #              4.9


Lymphocytes #              1.4


Monocytes #                0.8


Eosinophils #              0.0


Basophils #                0.0


Nucleated Red              0.0


Blood Cells #


Sodium Level               140


Potassium Level            4.0


Chloride Level             105


Carbon Dioxide              27


Level


Anion Gap                    8


Blood Urea                  13


Nitrogen


Creatinine                0.76


Est Glomerular     > 60  
        
                 
              



Filtrat


Rate
mL/min


Glucose Level               88


Hemoglobin A1c             5.0


Calcium Level              8.8


Magnesium Level            2.2


Total Bilirubin            0.8


Direct Bilirubin          0.00


Indirect                   0.8


Bilirubin


Aspartate Amino            18  
  
                 
              



Transf
(AST/SGOT)


Alanine                    17  
  
                 
              



Aminotransferase



(ALT/SGPT)


Alkaline                  131  H


Phosphatase


Total Protein             6.6  #


Albumin                    3.6


Globulin                  3.00


Albumin/Globulin          1.20


Ratio


Triglycerides               91


Level


Cholesterol Level          93  L


LDL Cholesterol,            40


Calculated


HDL Cholesterol             35


Cholesterol/HDL            2.6


Ratio


Thyroid                0.146  L
  
                 
              



Stimulating


Hormone
(TSH)








Medications


Medication





Current Medications


Sodium Chloride 1,000 ml @  75 mls/hr [id number] IV  Last administered on 4/10/19at


20:10; Admin Dose 75 MLS/HR;  Start 4/10/19 at 17:38


IV Flush (NS 3 ml) 3 ml PER PROTOCOL IV ;  Start 4/10/19 at 18:00


Lorazepam (Ativan) 2 mg Q10MIN  PRN IV seizure Last administered on 4/10/19at 


20:45; Admin Dose 2 MG;  Start 4/10/19 at 18:00


Ondansetron HCl (Zofran Inj) 4 mg Q6H  PRN IV NAUSEA/VOMITING;  Start 4/10/19 at


18:00


Acetaminophen (Tylenol Tab) 650 mg Q6H  PRN PO .PAIN 1-3 OR TEMP Last 


administered on 4/10/19at 20:11; Admin Dose 650 MG;  Start 4/10/19 at 18:00


Cefepime HCl 50 ml @  100 mls/hr Q12 IVPB  Last administered on 4/11/19at 08:05;


Admin Dose 100 MLS/HR;  Start 4/10/19 at 21:00


Vancomycin HCl (Vanco Iv Per Pharmacy) VANCOMYCIN PER PHARMACY PER PROTOCOL XX ;


 Start 4/10/19 at 18:00


Levetiracetam 100 ml @  400 mls/hr Q12 IVPB  Last administered on 4/11/19at 


09:11; Admin Dose 400 MLS/HR;  Start 4/10/19 at 21:00


Labetalol HCl (Labetalol) 10 mg Q4H  PRN IV sbp >160;  Start 4/10/19 at 18:30


Vancomycin HCl 1.25 gm/Sodium Chloride 250 ml @  83.333 mls/ hr Q12H IVPB  Last 


administered on 4/11/19at 08:05; Admin Dose 83.333 MLS/HR;  Start 4/11/19 at 


09:00





Past Medical History


reviewed


Home Meds


Active Scripts


Levetiracetam* (Keppra*) 500 Mg Tablet, 500 MG PO BID, #30 TAB


   Prov:BRAYDEN ARROYO         4/10/19


Reported Medications


Baclofen* (Baclofen*) 10 Mg Tablet, 10 MG PO TID, TAB


   4/10/19


Discontinued Reported Medications


[Clonazepam]   No Conflict Check


   10/25/18


[Baclofen]   No Conflict Check


   10/25/18


Medications





Current Medications


Sodium Chloride 1,000 ml @  75 mls/hr [id number] IV  Last administered on 4/10/19at


20:10; Admin Dose 75 MLS/HR;  Start 4/10/19 at 17:38


IV Flush (NS 3 ml) 3 ml PER PROTOCOL IV ;  Start 4/10/19 at 18:00


Lorazepam (Ativan) 2 mg Q10MIN  PRN IV seizure Last administered on 4/10/19at 20


:45; Admin Dose 2 MG;  Start 4/10/19 at 18:00


Ondansetron HCl (Zofran Inj) 4 mg Q6H  PRN IV NAUSEA/VOMITING;  Start 4/10/19 at


18:00


Acetaminophen (Tylenol Tab) 650 mg Q6H  PRN PO .PAIN 1-3 OR TEMP Last 


administered on 4/10/19at 20:11; Admin Dose 650 MG;  Start 4/10/19 at 18:00


Cefepime HCl 50 ml @  100 mls/hr Q12 IVPB  Last administered on 4/11/19at 08:05;


Admin Dose 100 MLS/HR;  Start 4/10/19 at 21:00


Vancomycin HCl (Vanco Iv Per Pharmacy) VANCOMYCIN PER PHARMACY PER PROTOCOL XX ;


 Start 4/10/19 at 18:00


Levetiracetam 100 ml @  400 mls/hr Q12 IVPB  Last administered on 4/11/19at 


09:11; Admin Dose 400 MLS/HR;  Start 4/10/19 at 21:00


Labetalol HCl (Labetalol) 10 mg Q4H  PRN IV sbp >160;  Start 4/10/19 at 18:30


Vancomycin HCl 1.25 gm/Sodium Chloride 250 ml @  83.333 mls/ hr Q12H IVPB  Last 


administered on 4/11/19at 08:05; Admin Dose 83.333 MLS/HR;  Start 4/11/19 at 


09:00


Allergies:  


Coded Allergies:  


     Iodine and Iodide Containing Produc (Verified  Allergy, Severe, HIVES, 


RASH, ITCHING, 4/10/19)





Past Surgical History


reviewed





Social History


reviewed


Smoking Status:  Never smoker











OSCAR BANSAL NP          Apr 11, 2019 11:41


DANIELA RAGLAND                 Apr 11, 2019 14:11

## 2019-04-11 NOTE — PN
Date/Time of Note


Date/Time of Note


DATE: 4/11/19 


TIME: 16:26





Objective


Vitals





Vital Signs


  Date      Temp  Pulse  Resp  B/P (MAP)   Pulse Ox  O2          O2 Flow    FiO2


Time                                                 Delivery    Rate


   4/11/19           84


     16:05


   4/11/19  98.6           22      149/92        96  Room Air


     15:47                          (111)








Intake and Output





4/10/19


4/10/19


4/11/19





1515:00


23:00


07:00





IntakeIntake Total


100 ml


2050 ml


120 ml





OutputOutput Total


500 ml





BalanceBalance


100 ml


2050 ml


-380 ml














Results


Result Diagram:  


4/11/19 0548                                                                    


           4/11/19 0548








Medications


Medications





Current Medications


Sodium Chloride 1,000 ml @  75 mls/hr [id number] IV  Last administered on 4/10/19at


20:10; Admin Dose 75 MLS/HR;  Start 4/10/19 at 17:38


IV Flush (NS 3 ml) 3 ml PER PROTOCOL IV ;  Start 4/10/19 at 18:00


Lorazepam (Ativan) 2 mg Q10MIN  PRN IV seizure Last administered on 4/10/19at 


20:45; Admin Dose 2 MG;  Start 4/10/19 at 18:00


Ondansetron HCl (Zofran Inj) 4 mg Q6H  PRN IV NAUSEA/VOMITING;  Start 4/10/19 at


18:00


Acetaminophen (Tylenol Tab) 650 mg Q6H  PRN PO .PAIN 1-3 OR TEMP Last 


administered on 4/10/19at 20:11; Admin Dose 650 MG;  Start 4/10/19 at 18:00


Cefepime HCl 50 ml @  100 mls/hr Q12 IVPB  Last administered on 4/11/19at 08:05;


Admin Dose 100 MLS/HR;  Start 4/10/19 at 21:00


Vancomycin HCl (Vanco Iv Per Pharmacy) VANCOMYCIN PER PHARMACY PER PROTOCOL XX ;


 Start 4/10/19 at 18:00


Labetalol HCl (Labetalol) 10 mg Q4H  PRN IV sbp >160;  Start 4/10/19 at 18:30


Vancomycin HCl 1.25 gm/Sodium Chloride 250 ml @  83.333 mls/ hr Q12H IVPB  Last 


administered on 4/11/19at 08:05; Admin Dose 83.333 MLS/HR;  Start 4/11/19 at 


09:00


Lorazepam (Ativan) 1 mg Q6  PRN PO AGITATION;  Start 4/11/19 at 12:30


Oxcarbazepine (Trileptal) 300 mg BID PO ;  Start 4/11/19 at 21:00


Miscellaneous Information (*Rx Drug Level Order Reminder*) VANCO TROUGH ON 


04/1... 0800  ONCE XX ;  Start 4/12/19 at 08:00;  Stop 4/12/19 at 08:01





VTE Prophylaxis


Risk score (from Ns)>0 risk:  3


SCD applied (from [facility]):  Yes





Lines/Catheters


IV Catheter Type:  


Lopez in Place:  No





Assessment/Plan


Hospital Course


Subjective


Patient states he feels back at baseline, wants to go back home, however 


explained to patient that we need to find out why he had fever and was found 


down on the ground.  Subsequently a few hours after patient had a verbal 


argument with his roommate and spit on roommate.





Objective





Physical exam





General: Patient is laying in bed and answers questions with mild speech 


impediment


Mentation: Patient is alert and oriented 


Head: Normocephalic atraumatic


Eyes: EOMI, pupils reactive to light


Neck: Supple, nontender, midline


Respiratory: Clear to auscultation bilaterally


Cardiovascular: regular rate, no obvious murmurs


Gastrointestinal: non-tender to palpation, bowel sounds heard.


Neurological: Left upper extremity is contracted however is easily straightened 


but returns to the contracted state, left lower extremity has minimal movement 


but there is some movement in the toes with sensation.  Right upper extremity 


and right lower extremity have full movement and sensation


Skin: No new skin lesions,





Assessment and plan





Acute encephalopathy, resolved


-The differential is extremely wide as there appears to be limited history, 


seizure versus CVA versus dehydration versus other infectious sources, however 


at this time it may be likely due to seizure


-Patient is cocaine positive


-Patient was volume depleted on admission


-IV antibiotics broad-spectrum, however will change to p.o. now infectious is 


less likely, patient does have sinusitis seen on MRI


-CT negative for acute CVA, MRI noted


-Neurology started Trileptal for seizure precautions


-Blood cultures pending


-UA negative


-Drug screen positive for cocaine


-EEG pending





History of bleeding per the per patient


-MRI results consistent with old brain bleed





Left upper extremity and left lower extremity weakness and contraction of the 


upper extremity


-Baseline per patient


-Monitor





Syncope, likely due to above seizure


-Patient found down by staff at living facility,, unknown true downtime


-CT negative for acute issues, old CVA, MRI noted


-Echocardiogram pending


-Carotid ultrasound noted





Mood disorder


-Code gray was called today, patient had argument with roommate, psych consult 


pending, no history in chart of mood disorder however patient likely has some 


sort of mood dysfunction.





SIRS


-Broad-spectrum antibiotic now changed to oral Levaquin for sinusitis


-Blood cultures


-IV fluids stopped





Sinusitis


-Oral Levaquin





Elevated hemoglobin


-Likely volume contraction due to volume depletion


-Monitor labs in a.m.





Disposition


-Appears possible seizure causing above encephalopathy, workup continuing, DC 


back to assisted living when stable.











CARMENCITA CHILEL                    Apr 11, 2019 16:38

## 2019-04-12 VITALS — HEART RATE: 77 BPM

## 2019-04-12 VITALS — DIASTOLIC BLOOD PRESSURE: 72 MMHG | HEART RATE: 72 BPM | RESPIRATION RATE: 20 BRPM | SYSTOLIC BLOOD PRESSURE: 132 MMHG

## 2019-04-12 VITALS — RESPIRATION RATE: 22 BRPM | HEART RATE: 84 BPM | SYSTOLIC BLOOD PRESSURE: 144 MMHG | DIASTOLIC BLOOD PRESSURE: 82 MMHG

## 2019-04-12 VITALS — HEART RATE: 95 BPM

## 2019-04-12 VITALS — HEART RATE: 88 BPM

## 2019-04-12 VITALS — HEART RATE: 67 BPM

## 2019-04-12 VITALS — HEART RATE: 70 BPM

## 2019-04-12 VITALS — DIASTOLIC BLOOD PRESSURE: 94 MMHG | RESPIRATION RATE: 18 BRPM | HEART RATE: 115 BPM | SYSTOLIC BLOOD PRESSURE: 161 MMHG

## 2019-04-12 VITALS — HEART RATE: 72 BPM | RESPIRATION RATE: 22 BRPM | SYSTOLIC BLOOD PRESSURE: 134 MMHG | DIASTOLIC BLOOD PRESSURE: 80 MMHG

## 2019-04-12 VITALS — HEART RATE: 83 BPM

## 2019-04-12 LAB
ADD MAN DIFF?: NO
ANION GAP: 11 (ref 5–13)
BASOPHIL #: 0 10^3/UL (ref 0–0.1)
BASOPHILS %: 0.3 % (ref 0–2)
BLOOD UREA NITROGEN: 8 MG/DL (ref 7–20)
CALCIUM: 9.5 MG/DL (ref 8.4–10.2)
CARBON DIOXIDE: 28 MMOL/L (ref 21–31)
CHLORIDE: 103 MMOL/L (ref 97–110)
CREATINE KINASE: 106 IU/L (ref 23–200)
CREATININE: 0.66 MG/DL (ref 0.61–1.24)
EOSINOPHILS #: 0 10^3/UL (ref 0–0.5)
EOSINOPHILS %: 0.3 % (ref 0–7)
GLUCOSE: 108 MG/DL (ref 70–220)
HEMATOCRIT: 47.7 % (ref 42–52)
HEMOGLOBIN: 16.3 G/DL (ref 14–18)
IMMATURE GRANS #M: 0.02 10^3/UL (ref 0–0.03)
IMMATURE GRANS % (M): 0.3 % (ref 0–0.43)
LYMPHOCYTES #: 0.8 10^3/UL (ref 0.8–2.9)
LYMPHOCYTES %: 10.8 % (ref 15–51)
MAGNESIUM: 2 MG/DL (ref 1.7–2.5)
MEAN CORPUSCULAR HEMOGLOBIN: 32 PG (ref 29–33)
MEAN CORPUSCULAR HGB CONC: 34.2 G/DL (ref 32–37)
MEAN CORPUSCULAR VOLUME: 93.5 FL (ref 82–101)
MEAN PLATELET VOLUME: 9.3 FL (ref 7.4–10.4)
MONOCYTE #: 0.5 10^3/UL (ref 0.3–0.9)
MONOCYTES %: 6.8 % (ref 0–11)
NEUTROPHIL #: 5.8 10^3/UL (ref 1.6–7.5)
NEUTROPHILS %: 81.5 % (ref 39–77)
NUCLEATED RED BLOOD CELLS #: 0 10^3/UL (ref 0–0)
NUCLEATED RED BLOOD CELLS%: 0 /100WBC (ref 0–0)
PHOSPHORUS: 2.4 MG/DL (ref 2.5–4.9)
PLATELET COUNT: 254 10^3/UL (ref 140–415)
POTASSIUM: 4.1 MMOL/L (ref 3.5–5.1)
RED BLOOD COUNT: 5.1 10^6/UL (ref 4.7–6.1)
RED CELL DISTRIBUTION WIDTH: 11.9 % (ref 11.5–14.5)
SODIUM: 142 MMOL/L (ref 135–144)
WHITE BLOOD COUNT: 7.2 10^3/UL (ref 4.8–10.8)

## 2019-04-12 RX ADMIN — OXCARBAZEPINE SCH MG: 300 TABLET, FILM COATED ORAL at 20:29

## 2019-04-12 RX ADMIN — MESALAMINE 1 MG: 400 CAPSULE, DELAYED RELEASE ORAL at 17:00

## 2019-04-12 RX ADMIN — FOLIC ACID SCH MLS/HR: 5 INJECTION, SOLUTION INTRAMUSCULAR; INTRAVENOUS; SUBCUTANEOUS at 08:53

## 2019-04-12 RX ADMIN — MESALAMINE 1 MG: 400 CAPSULE, DELAYED RELEASE ORAL at 20:29

## 2019-04-12 RX ADMIN — LEVOFLOXACIN SCH MG: 500 TABLET, FILM COATED ORAL at 06:08

## 2019-04-12 RX ADMIN — OXCARBAZEPINE 1 MG: 300 TABLET, FILM COATED ORAL at 20:29

## 2019-04-12 RX ADMIN — MESALAMINE 1 MG: 400 CAPSULE, DELAYED RELEASE ORAL at 13:08

## 2019-04-12 RX ADMIN — THIAMINE HYDROCHLORIDE 1 MLS/HR: 100 INJECTION, SOLUTION INTRAMUSCULAR; INTRAVENOUS at 08:53

## 2019-04-12 RX ADMIN — LEVOFLOXACIN 1 MG: 500 TABLET, FILM COATED ORAL at 06:08

## 2019-04-12 RX ADMIN — OXCARBAZEPINE SCH MG: 300 TABLET, FILM COATED ORAL at 08:53

## 2019-04-12 RX ADMIN — OXCARBAZEPINE 1 MG: 300 TABLET, FILM COATED ORAL at 08:53

## 2019-04-12 NOTE — PSY
Date/Time of Note


Date/Time of Note


DATE: 4/12/19 


TIME: 11:39





Psychiatric Subjective Eval


Consent


Pt consented to telemedicine:  No





Subjective Evaluation


Patient location:  inpatient


Chief Complaint:  BIB RA EVAL OF ALOC. ORAL TRAUMA. NON VERBAL


History of present illness


Patient is a  male with a past medical history for CVA , Crohn's, found


down with altered level of consciousness and with bleeding from the mouth.  On a


face-to-face evaluation, patient states he was angry at the at his roommates, 


because the roommate was threatening him intrusive and urinary irritable and 


invading his personal space.. Patient states he was not able to to strike out 


bed decided to spit on his roommates as a way of getting back at him.  Patient 


is remorseful states he would not have done  that, but he was provoked into 


doing that he denies suicidal thoughts, denies feeling of hopelessness, denies 


homicidal thoughts and contracted for safety.  Discussed risk and benefits of 


antipsychotic to help patients with acute agitation however patient declined and


states he goes for counseling and will not will come any antipsychotic.  Patient


currently takes anxiety medications and states he is fine with that.


Past psychiatric history


Admits is been treated with counseling


Hospitalization:  other


Medical history


Problems


Medical Problems:


(1) Seizure


Status: Acute  








Allergies:  


Coded Allergies:  


     Iodine and Iodide Containing Produc (Verified  Allergy, Severe, HIVES, 


RASH, ITCHING, 4/10/19)





Substance Abuse


Substance abuse history:  No


Prior substance abuse treatmen:  No





Social History


Marital status:  other


DPA/Conservatorship:  No





Psychiatric Objective Eval


Review of Systems:


Review of Systems:  Not Applicable





Physical Examination:


Sleep:  Insomnia, Adequate


Appetite:  Adequate


Energy:  Adequate


Interest:  Adequate





Mental Status Examination:


Appearance:  Poor Hygiene


Eye Contact:  Fair


Psychomotor Activity:  Normal


Behavior:  Cooperative


Speech:  Clear


AFFECT:  Appropriate


Mood:  Appropriate/Full


Though Process:  Linear


Orientation:  x4


Cognition:  Alert


Insight:  Mild


Judgement:  Mild


Attention Span:  Distractible


Laboratory Results





Laboratory Tests


Test
              4/10/19
17:04    4/10/19
17:06   4/10/19
19:45  4/10/19
22:13


POC Venous           2.1 mmol/L


Lactate


Urine Color                       YELLOW


Urine Clarity
     
              SLIGHTLY
CLOUDY  
               



Urine pH                                     5.0


Urine Specific                             1.017


Gravity


Urine Ketones                           2+ mg/dL


Urine Nitrite                     NEGATIVE mg/dL


Urine Bilirubin                   NEGATIVE mg/dL


Urine                             NEGATIVE mg/dL


Urobilinogen


Urine Leukocyte    
              NEGATIVE
Ravindra/ul  
               



Esterase



Urine Microscopic                 > 182 /HPF


RBC


Urine Microscopic                         5 /HPF


WBC


Urine Mucus                       FEW /HPF


Urine Hemoglobin                        3+ mg/dL


Urine Glucose                     NEGATIVE mg/dL


Urine Total                             1+ mg/dl


Protein


Urine Opiates                     Negative


Screen


Urine                             Negative


Barbiturates


Urine                             Negative


Amphetamines


Screen


Urine                             Negative


Benzodiazepines


Screen


Urine Cocaine                     Positive


Screen


Urine                             Negative


Cannabinoids


Prothrombin Time                                        13.3 Sec


Prothrombin Time                                             1.0


Ratio


INR International  
              
                        1.00 
  



Normalized
Ratio


Activated          
              
                    28.2 Sec 
  



Partial
Thrombopl


ast Time


Sodium Level                                          141 mmol/L


Potassium Level                                       4.5 mmol/L


Chloride Level                                        104 mmol/L


Carbon Dioxide                                         25 mmol/L


Level


Anion Gap                                                     12


Blood Urea                                              11 mg/dl


Nitrogen


Creatinine                                            0.80 mg/dl


Est Glomerular     
              
                > 60 mL/min 
   



Filtrat


Rate
mL/min


Glucose Level                                          106 mg/dl


Lactic Acid Level                                     1.6 mmol/L     0.9 mmol/L


Calcium Level                                          9.3 mg/dl


Total Bilirubin                                        0.5 mg/dl


Direct Bilirubin                                      0.00 mg/dl


Indirect                                               0.5 mg/dl


Bilirubin


Aspartate Amino    
              
                     20 IU/L 
  



Transf
(AST/SGOT)


Alanine            
              
                     16 IU/L 
  



Aminotransferase



(ALT/SGPT)


Alkaline                                                159 IU/L


Phosphatase


C-Reactive                                             1.4 mg/dl


Protein


Total Protein                                           7.8 g/dl


Albumin                                                 4.3 g/dl


Globulin                                               3.50 g/dl


Albumin/Globulin                                            1.22


Ratio


Lipase                                                   232 U/L


Test
              4/11/19
05:46    4/11/19
05:48   4/12/19
09:53  



Free Thyroxine       0.84 ng/dl


White Blood Count                    7.2 10^3/ul     7.2 10^3/ul


Red Blood Count                     4.64 10^6/ul    5.10 10^6/ul


Hemoglobin                             14.6 g/dl       16.3 g/dl


Hematocrit                                44.4 %          47.7 %


Mean Corpuscular                         95.7 fl         93.5 fl


Volume


Mean Corpuscular                         31.5 pg         32.0 pg


Hemoglobin


Mean Corpuscular   
                  32.9 g/dl 
     34.2 g/dl 
  



Hemoglobin
Concen


t


Red Cell                                  12.5 %          11.9 %


Distribution


Width


Platelet Count                       220 10^3/UL     254 10^3/UL


Mean Platelet                             9.6 fl          9.3 fl


Volume


Immature                                 0.300 %         0.300 %


Granulocytes %


Neutrophils %                             68.5 %          81.5 %


Lymphocytes %                             19.3 %          10.8 %


Monocytes %                               11.2 %           6.8 %


Eosinophils %                              0.3 %           0.3 %


Basophils %                                0.4 %           0.3 %


Nucleated Red                        0.0 /100WBC     0.0 /100WBC


Blood Cells %


Immature                           0.020 10^3/ul   0.020 10^3/ul


Granulocytes #


Neutrophils #                        4.9 10^3/ul     5.8 10^3/ul


Lymphocytes #                        1.4 10^3/ul     0.8 10^3/ul


Monocytes #                          0.8 10^3/ul     0.5 10^3/ul


Eosinophils #                        0.0 10^3/ul     0.0 10^3/ul


Basophils #                          0.0 10^3/ul     0.0 10^3/ul


Nucleated Red                        0.0 10^3/ul     0.0 10^3/ul


Blood Cells #


Sodium Level                          140 mmol/L      142 mmol/L


Potassium Level                       4.0 mmol/L      4.1 mmol/L


Chloride Level                        105 mmol/L      103 mmol/L


Carbon Dioxide                         27 mmol/L       28 mmol/L


Level


Anion Gap                                      8              11


Blood Urea                              13 mg/dl         8 mg/dl


Nitrogen


Creatinine                            0.76 mg/dl      0.66 mg/dl


Est Glomerular     
              > 60 mL/min 
    > 60 mL/min 
   



Filtrat


Rate
mL/min


Glucose Level                           88 mg/dl       108 mg/dl


Hemoglobin A1c                             5.0 %


Calcium Level                          8.8 mg/dl       9.5 mg/dl


Magnesium Level                        2.2 mg/dl       2.0 mg/dl


Total Bilirubin                        0.8 mg/dl


Direct Bilirubin                      0.00 mg/dl


Indirect                               0.8 mg/dl


Bilirubin


Aspartate Amino    
                    18 IU/L 
  
               



Transf
(AST/SGOT)


Alanine            
                    17 IU/L 
  
               



Aminotransferase



(ALT/SGPT)


Alkaline                                131 IU/L


Phosphatase


Total Protein                           6.6 g/dl


Albumin                                 3.6 g/dl


Globulin                               3.00 g/dl


Albumin/Globulin                            1.20


Ratio


Triglycerides                           91 mg/dl


Level


Cholesterol Level                       93 mg/dl


LDL Cholesterol,                        40 mg/dl


Calculated


HDL Cholesterol                         35 mg/dl


Cholesterol/HDL                        2.6 RATIO


Ratio


Thyroid            
                0.146 MIU/L 
  
               



Stimulating


Hormone
(TSH)


Phosphorus Level                                       2.4 mg/dl


Creatine Kinase                                         106 IU/L








Assessment and Plan


Assessment/Diagnosis


Diagnosis


Anxiety disorder





Recommendation/Plan


Medication Management


Continue on current medications


Multiple antipsychotics:  No


Discharge Disposition:  Other


Legal Status:  Voluntary (Patient does not meet criteria for 5150 hold)











ONYEKWE,RENE R NP          Apr 12, 2019 11:47

## 2019-04-12 NOTE — CONS
Assessment/Plan


Assessment/Plan


Hospital Course


52 yo M with reported Hx of ICH anxiety and other comorbidities who presents for


evaluation of ams... for which neurology is consulted. 





The clinical picture is concerning for seizure...his first of life...





MRI brain is notable for R frontal lobe encephalomalacia... a likely 


epileptogenic focus. 


EEG was without ongoing epileptiform activity..





UDS + cocaine








P:


Cont Trileptal for seizure ppx. 


Hold Keppra, as it can exacerbate underlying psychiatric comorbidities


Ativan IV PRN seizure > 5 min or for cluster


Cont other medical management per primary


Neurologically cleared for d/c, when medically able





Consultation Date/Type/Reason


Admit Date/Time


Apr 10, 2019 at 17:41


Type of Consult


Neurology


Reason for Consultation


seizures


Requesting Provider:  CARMENCITA CHILEL


Date/Time of Note


DATE: 4/12/19 


TIME: 12:15





24 HR Interval Summary


Free Text/Dictation


Continues acute care.





Exam


Vital Signs


Vitals





Vital Signs


  Date      Temp  Pulse  Resp  B/P (MAP)   Pulse Ox  O2          O2 Flow    FiO2


Time                                                 Delivery    Rate


   4/12/19           95


     12:07


   4/12/19  98.6           22      144/82            Room Air


     07:21                          (102)


   4/12/19                                       99


     03:53








Intake and Output





4/11/19 4/11/19 4/12/19





1515:00


23:00


07:00





IntakeIntake Total


400 ml


1880 ml


1500 ml





OutputOutput Total


1500 ml


1300 ml





BalanceBalance


400 ml


380 ml


200 ml














Exam


PE:


Gen Appearance: No Apparent Distress


HEENT: Normocephalic


Cardiovascular: Regular rate


Lungs: Clear bilaterally


Abdomen: Soft


Extremities: Dry





NE:


The patient was alert and grossly oriented. Language was dysarthric. Fund of 


knowledge was normal. Attention span limited.





Pupils were equal and reactive to light. There was no afferent pupillary defect.


Visual fields were normal. Funduscopic examination was limited. Extra-ocular 


movements were full. Ptosis was absent. There was no nystagmus. Facial sensation


was normal. Face was symmetric with normal strength. Hearing was intact. Palate 


movements were normal. Neck strength was normal. Tongue was swollen, though had 


a normal speed of movement.





Tone was normal. Muscle bulk was normal. I did not see fasciculations. Arms and 


legs were weak on the L.





Vibration sensation was normal. Temperature and pinprick sensation was normal.





Rapid alternating movements were normal. There was no dysmetria. There was no 


intention tremor. Gait was deferred due to bedrest.





Arm and leg reflexes were 2+ and symmetric. Golden's sign was absent. Plantar 


responses were flexor.











OSCAR BANSAL NP          Apr 12, 2019 12:15


DANIELA RAGLAND                 Apr 12, 2019 15:26

## 2019-04-12 NOTE — PN
Date/Time of Note


Date/Time of Note


DATE: 4/12/19 


TIME: 12:27





Objective


Vitals





Vital Signs


  Date      Temp  Pulse  Resp  B/P (MAP)   Pulse Ox  O2          O2 Flow    FiO2


Time                                                 Delivery    Rate


   4/12/19           95


     12:07


   4/12/19  98.6           22      144/82            Room Air


     07:21                          (102)


   4/12/19                                       99


     03:53








Intake and Output





4/11/19 4/11/19 4/12/19





1515:00


23:00


07:00





IntakeIntake Total


400 ml


1880 ml


1500 ml





OutputOutput Total


1500 ml


1300 ml





BalanceBalance


400 ml


380 ml


200 ml














Results


Result Diagram:  


4/12/19 0953 4/12/19 0953








Medications


Medications





Current Medications


IV Flush (NS 3 ml) 3 ml PER PROTOCOL IV ;  Start 4/10/19 at 18:00


Lorazepam (Ativan) 2 mg Q10MIN  PRN IV seizure Last administered on 4/10/19at 


20:45; Admin Dose 2 MG;  Start 4/10/19 at 18:00


Ondansetron HCl (Zofran Inj) 4 mg Q6H  PRN IV NAUSEA/VOMITING;  Start 4/10/19 at


18:00


Acetaminophen (Tylenol Tab) 650 mg Q6H  PRN PO .PAIN 1-3 OR TEMP Last 


administered on 4/11/19at 23:27; Admin Dose 650 MG;  Start 4/10/19 at 18:00


Labetalol HCl (Labetalol) 10 mg Q4H  PRN IV sbp >160;  Start 4/10/19 at 18:30


Oxcarbazepine (Trileptal) 300 mg BID PO  Last administered on 4/12/19at 08:53; 


Admin Dose 300 MG;  Start 4/11/19 at 21:00


Levofloxacin (Levaquin) 500 mg DAILY@06 PO  Last administered on 4/12/19at 


06:08; Admin Dose 500 MG;  Start 4/12/19 at 06:00


Mesalamine (Delzicol Dr) 400 mg QID PO ;  Start 4/12/19 at 13:00


Clonazepam (Klonopin) 2 mg QHS  PRN PO anxiety/insomenia;  Start 4/12/19 at 


10:30





VTE Prophylaxis


Risk score (from Nsg)>0 risk:  3


SCD applied (from Nsg):  Yes





Lines/Catheters


IV Catheter Type:  


Lopez in Place:  No





Assessment/Plan


Hospital Course


Subjective


Patient doing well, no acute change from baseline, no seizure episodes





Objective





Physical exam





General: Patient is laying in bed and answers questions with mild speech imp


ediment


Mentation: Patient is alert and oriented 


Head: Normocephalic atraumatic is some 25


Eyes: EOMI, pupils reactive to light


Neck: Supple, nontender, midline


Respiratory: Clear to auscultation bilaterally


Cardiovascular: regular rate, no obvious murmurs


Gastrointestinal: non-tender to palpation, bowel sounds heard.


Neurological: Left upper extremity is contracted however is easily straightened 


but returns to the contracted state, left lower extremity has fairly good 


mobility except for his left foot which is chronically curved.  Right upper 


extremity and right lower extremity have full movement and sensation


Skin: No new skin lesions,





Assessment and plan





Acute encephalopathy, resolved


-The differential is extremely wide as there appears to be limited history, 


seizure versus CVA versus dehydration versus other infectious sources, however 


at this time it may be likely due to seizure


-Patient is cocaine positive


-Patient was volume depleted on admission


-IV antibiotics broad-spectrum, however will change to p.o. now, since true 


systemic infectious cause is less likely, patient does have sinusitis seen on 


MRI


-CT negative for acute CVA, MRI noted


-Neurology started Trileptal for seizure precautions


-Blood cultures pending


-UA negative


-Drug screen positive for cocaine


-EEG noted





History of bleeding per the per patient


-MRI results consistent with old brain bleed





Left upper extremity and left foot weakness and contraction of the upper 


extremity


-Baseline per patient, patient uses a special shoe to straighten out his left 


foot and uses a four-point cane to ambulate, also has a wheelchair.


-Monitor





Insomnia


-Patient takes Klonopin to go to sleep, as needed Klonopin





Syncope, likely due to above seizure


-Patient found down by staff at living facility,, unknown true downtime


-CT negative for acute issues, old CVA, MRI noted


-Echocardiogram pending


-Carotid ultrasound noted





Mood disorder


-Code gray was called today, patient had argument with roommate, psych consult 


pending, no history in chart of mood disorder however patient likely has some 


sort of mood dysfunction.





SIRS


-Broad-spectrum antibiotic now changed to oral Levaquin for sinusitis


-Blood cultures


-IV fluids stopped





Sinusitis


-Oral Levaquin





Elevated hemoglobin


-Likely volume contraction due to volume depletion


-Monitor labs in a.m.





Crohn's disease


-Patient follows up with outpatient GI, continue mesalamine





Disposition


-Monitor patient if patient is stable tomorrow, will arrange transportation back


to his assisted living.











CARMENCITA CHILEL                    Apr 12, 2019 12:31

## 2019-04-12 NOTE — EEG
EEG NOTE


Report Details


DATE OF TEST: 4/11/19





HISTORY:


The patient is a 53-year-old M who presents with altered mental status.





This EEG is requested to evaluate for an epileptic disorder.





SEDATION:


None.





CONDITIONS OF RECORDING:


This EEG was recorded digitally on the [employer] machine, using the 


International 10-20 System of electrodes plus anterior temporals and Nz.





STATES SAMPLED:


Wakefulness and drowsiness.





FINDINGS:


During wakefulness, there is a 8 Hz posterior dominant rhythm, which attenuates 


normally with eye opening.





There is a normal anterior-to-posterior frequency-amplitude gradient.





The remainder of the awake background is notable for admixed theta and delta 


activity.





Photic stimulation does not elicit any definite driving responses or 


epileptiform discharges.





Hyperventilation was not performed.





The patient became drowsy but did not pass into sleep.





No asymmetries, focal abnormalities or epileptiform discharges were seen.











IMPRESSION:


Abnormal electroencephalogram due to: mild diffuse slowing.





COMMENT:


The slowing of the background indicates mild, diffuse cortical dysfunction of 


nonspecific etiology.











DANIELA RAGLAND                 Apr 12, 2019 15:19

## 2019-04-13 VITALS — DIASTOLIC BLOOD PRESSURE: 76 MMHG | SYSTOLIC BLOOD PRESSURE: 124 MMHG | RESPIRATION RATE: 16 BRPM | HEART RATE: 76 BPM

## 2019-04-13 VITALS — SYSTOLIC BLOOD PRESSURE: 132 MMHG | HEART RATE: 76 BPM | DIASTOLIC BLOOD PRESSURE: 80 MMHG | RESPIRATION RATE: 16 BRPM

## 2019-04-13 VITALS — HEART RATE: 66 BPM

## 2019-04-13 VITALS — HEART RATE: 69 BPM | DIASTOLIC BLOOD PRESSURE: 92 MMHG | RESPIRATION RATE: 16 BRPM | SYSTOLIC BLOOD PRESSURE: 158 MMHG

## 2019-04-13 VITALS — HEART RATE: 71 BPM

## 2019-04-13 VITALS — HEART RATE: 69 BPM

## 2019-04-13 RX ADMIN — LEVOFLOXACIN SCH MG: 500 TABLET, FILM COATED ORAL at 05:57

## 2019-04-13 RX ADMIN — OXCARBAZEPINE SCH MG: 300 TABLET, FILM COATED ORAL at 08:43

## 2019-04-13 RX ADMIN — MESALAMINE 1 MG: 400 CAPSULE, DELAYED RELEASE ORAL at 08:43

## 2019-04-13 RX ADMIN — MESALAMINE 1 MG: 400 CAPSULE, DELAYED RELEASE ORAL at 12:13

## 2019-04-13 RX ADMIN — OXCARBAZEPINE 1 MG: 300 TABLET, FILM COATED ORAL at 08:43

## 2019-04-13 RX ADMIN — LEVOFLOXACIN 1 MG: 500 TABLET, FILM COATED ORAL at 05:57

## 2019-04-13 NOTE — DS
Date/Time of Note


Date/Time of Note


DATE: 4/13/19 


TIME: 09:58





Discharge Summary


Admission/Discharge Info


Admit Date/Time


Apr 10, 2019 at 17:41


Discharge Date/Time





Patient Condition:  Stable


Hospital Course


Patient is a  male with a past medical history significant for Crohn's 


disease, mood disorder, CVA with residual left upper extremity contracture and 


weakness and left foot weakness who presents to [facility] for


altered mental status.  Patient was seen by neurology and it was highly likely 


patient suffered from a seizure and had been suffering from seizures in the past


as he had episodes of finding himself on the ground with unknown cause.  Patient


was also cocaine positive.  Patient was counseled extensively on stopping 


cocaine and mixing with other medications.  Patient was also told to stop 


baclofen as quickly as possible.  Patient was started on antiepileptic per 


neurology and subsequently was monitored.  Incidentally on MRI a sinusitis was 


found and patient was put on appropriate oral antibiotics and will be discharged


on an appropriate course of antibiotic as well.  Patient will continue home 


medication and will give a new antibiotic for the next 5 days as well as a 


prescription for the antiepileptic medication.  Patient feels well but due to 


his chronic deficit, physical therapy and home health physical therapy was 


offered to the patient however patient stated that he did not want to wait for 


physical therapist to evaluate him and stated he will be fine at home and 


refused physical therapy and home health physical therapy evaluation.  Patient 


states he has a wheelchair and a four-point cane at home and ambulates perfectly


fine with a special shoe.  Patient is alert and oriented and able to make his 


own decisions and has refused physical therapy evaluation.  Patient will be 


discharged home with ambulance





Discharge diagnosis


Acute encephalopathy, resolved likely secondary to seizure


Likely seizure


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


Chronic left foot weakness


Chronic insomnia


Syncope, likely secondary to above seizure, resolved


Mood disorder


Sinusitis


Crohn's disease


Home Meds


Active Scripts


Mesalamine (Delzicol) 400 Mg Cap.drtab., 400 MG PO QID for 30 Days


   Prov:CARMENCITA CHILEL         4/13/19


Oxcarbazepine* (Oxcarbazepine*) 300 Mg Tablet, 300 MG PO BID for 30 Days, #60 


TAB 1 Refill


   Prov:CARMENCITA CHILEL         4/13/19


Levofloxacin* (Levaquin*) 500 Mg Tablet, 500 MG PO DAILY for 5 Days, #5 TAB


   Prov:CARMENCITA CHILEL         4/13/19


Discontinued Reported Medications


Baclofen* (Baclofen*) 10 Mg Tablet, 10 MG PO TID, TAB


   4/10/19


[Clonazepam]   No Conflict Check


   10/25/18


[Baclofen]   No Conflict Check


   10/25/18


Discontinued Scripts


Levetiracetam* (Keppra*) 500 Mg Tablet, 500 MG PO BID, #30 TAB


   Prov:BRAYDEN ARROYO         4/10/19


Follow-up Plan


1.  Please follow-up with your primary care provider within 1 week.  You will 


need to get a repeat of your thyroid blood work as it was showing low TSH.


2.  Please continue medication as directed, finish antibiotic, continue 


oxcarbazepine until a neurologist tells you to stop,


3. please follow-up with your primary care provider in order to get a referral 


to a urologist as soon as possible.


4.  Stop cocaine


Primary Care Provider


Not On Staff Doctor


Time spent on discharge:   > 30 minutes











CARMENCITA CHILEL                    Apr 13, 2019 09:58

## 2019-04-13 NOTE — PDOCDIS
Discharge Instructions


CONDITION


                [id number]
Patient Condition:  [id number]
Stable








FOLLOW UP/APPOINTMENTS


Follow-up Plan


1.  Please follow-up with your primary care provider within 1 week.  You will 


need to get a repeat of your thyroid blood work as it was showing low TSH.


2.  Please continue medication as directed, finish antibiotic, continue 


oxcarbazepine until a neurologist tells you to stop,


3. please follow-up with your primary care provider in order to get a referral 


to a urologist as soon as possible.


4.  Stop cocaine











CARMENCITA CHILEL                    Apr 13, 2019 09:52

## 2019-04-13 NOTE — CONS
Assessment/Plan


Assessment/Plan


Hospital Course


54 yo M with reported Hx of ICH anxiety and other comorbidities who presents for


evaluation of ams... for which neurology is consulted. 





The clinical picture is concerning for seizure...his first of life...





MRI brain is notable for R frontal lobe encephalomalacia... a likely 


epileptogenic focus. 


EEG was without ongoing epileptiform activity..





UDS + cocaine








P:


Cont Trileptal for seizure ppx. 


Hold Keppra, as it can exacerbate underlying psychiatric comorbidities


Ativan IV PRN seizure > 5 min or for cluster


Cont other medical management per primary


Neurologically cleared for d/c, when medically able





Consultation Date/Type/Reason


Admit Date/Time


Apr 10, 2019 at 17:41


Type of Consult


Neurology


Reason for Consultation


seizures


Requesting Provider:  CARMENCITA CHILEL


Date/Time of Note


DATE: 4/13/19 


TIME: 09:18





24 HR Interval Summary


Free Text/Dictation


Continues acute care.





Exam


Vital Signs


Vitals





Vital Signs


  Date      Temp  Pulse  Resp  B/P (MAP)   Pulse Ox  O2          O2 Flow    FiO2


Time                                                 Delivery    Rate


   4/13/19  97.6     76    16      124/76        99


     07:29                           (92)


   4/12/19                                           Room Air


     15:26








Intake and Output





4/12/19 4/12/19 4/13/19





1515:00


23:00


07:00





IntakeIntake Total


250 ml


1600 ml


800 ml





OutputOutput Total


1220 ml


950 ml





BalanceBalance


250 ml


380 ml


-150 ml














Exam


PE:


Gen Appearance: No Apparent Distress


HEENT: Normocephalic


Cardiovascular: Regular rate


Lungs: Clear bilaterally


Abdomen: Soft


Extremities: Dry





NE:


The patient was alert and grossly oriented. Language was normal. Fund of 


knowledge was normal. Attention span limited.





Pupils were equal and reactive to light. There was no afferent pupillary defect.


Visual fields were normal. Funduscopic examination was limited. Extra-ocular 


movements were full. Ptosis was absent. There was no nystagmus. Facial sensation


was normal. Face was symmetric with normal strength. Hearing was intact. Palate 


movements were normal. Neck strength was normal. Tongue was swollen, though had 


a normal speed of movement.





Tone was normal. Muscle bulk was normal. I did not see fasciculations. Arms and 


legs were weak on the L.





Vibration sensation was normal. Temperature and pinprick sensation was normal.





Rapid alternating movements were normal. There was no dysmetria. There was no 


intention tremor. Gait was deferred due to bedrest.





Arm and leg reflexes were 2+ and symmetric. Goledn's sign was absent. Plantar 


responses were flexor.











OSCAR BANSAL NP          Apr 13, 2019 09:18

## 2020-06-21 NOTE — NUR
Patient discharged to home in stable condition.  Written and verbal after care 
instructions given. 

Patient verbalizes understanding of instructions. Stressed follow up or return 
to ER for worsening s/s. All belongings with patient.

## 2020-06-22 NOTE — NUR
9:15am:  SW arrived to the ED after receiving a phone call from ED RN Rafael, stating that 
patient was seen during the night shift, but requested to see a , and was 
waiting in the ED.  SW met with day shift RN Richard, and Dr. Peralta, to discuss patient's 
needs.  SW then met with the patient, in his assigned ED room.  Patient is a 55 year old 
male, homeless, who came into the ED last night due to anxiety (see MD notes).  Patient was 
medically cleared, and discharged, however was waiting to meet with this SW to discuss 
possible placement options and community resources.  Patient is alert, oriented x 4, 
cooperative with this SW.  Patient uses a wheelchair for mobility.  Patient also has a cell 
phone which he was using to talk to someone when SW entered his ED room.  Patient reports he 
has been homeless for the past month, after living in hotels and being in a substance abuse 
treatment program.  Patient reports hx of using cocaine and oxycodone, but stated that he 
has not used any drugs for the past month.  Patient screened for SI, but patient denied any 
suicidal ideations.  Patient stated that he receives SSI, but that he only has $10 left for 
the month.  SW discussed the different homeless community resources, including shelters, but 
informed the patient that given the current pandemic, shelter availability was very limited. 
 Patient expressed understanding.  Patient stated that he had been in contact with someone 
at [facility], but that no one called him back.  SW stated that SW call follow-up on 
[facility], and call some of the local shelters to see if there was any availability, 
and then will come back to see the patient again to further discuss.  Patient agreed.



10:00am:  SW called [facility], [phone] and spoke with Margaux, 
inquiring about shelter availability.  Margaux informed this SW that they were not taking 
in any more intakes for the emergency shelter, and that they were closing the shelter 
program tomorrow.



10:03am:  SW called [facility], [phone], no answer.



10:05am:  SW called [facility], [phone], and spoke with Inna, 
inquiring about shelter availability. Inna informed this SW that they were not taking in 
any new intakes for the emergency shelter, and that they were working towards closing out 
the program.



10:07am:  JUAN called the [facility], [phone] and spoke with 
Jorge, inquiring about shelter availability.  Jorge informed this SW that they had 
stopped taking in new intakes about 1 month ago.



10:07am:  JUAN called [facility] again, [phone], but once again, no 
answer. 



10:15am:  JUAN called [address] to inquire about homeless shelter availability in [facility].  JUAN 
spoke with [address] representative Kari, who stated that the only available shelter right now 
was the [facility] at [address] [place] [address], [place] [address], [phone]. 



10:30am:  JUAN called the [facility], [phone], and spoke with Tucker, inquiring 
about bed availability.  Tucker stated that the beds were full for today, and that 
individuals would have to line up at the shelter by 7:30am every morning for bed 
availability.  Tucker stated that beds were available on a first come, first serve basis.  




10:42am:  JUAN called [facility], [phone], and spoke with Shannon, wanting to make a 
referral for the patient.  Shannon looked into their records, and stated that they already had 
a referral for this patient, and that the patient was on a wait-list.  JUAN asked for the time 
frame of the waitlist, and Shannon stated that there was no estimate on the time frame, but 
that patient would be contacted in order of the waitlist.  



11:00am:  SW returned to the ED, reported all above to Dr. Peralta and LARA Ramos.  SW then 
met with patient, and informed him of all above findings.  Patient expressed understanding.  
SW provided patient with the Homeless Resource packet, which includes the [phone] [facility] 
Winter Shelter Program that provides locations of the shelters in Service Areas 1 thru 8, 
and informed the patient that the [facility] in [place] is open for new intakes every 
morning at 7:30am and that it was on a first come, first serve basis; the [facility] homeless directory which provides a list of places that individuals can go to 
throughout the week for hot meals, sack lunches, food pantries, and showers; a list of 
mental health clinics: [facility] [address] [place] [address], 
[phone]; [facility] [address] [facility] [place] [address], [phone]; [facility] [address] [place] 
[address], [phone];  a list of medical clinics: [facility] [address], [place]. [place], [phone]; [facility] [address], [address], [place] [address];  [facility] [address] [place] [address], [phone]; and a list of 
substance abuse programs: [facility] Substance Abuse Self-helpline [phone]) [phone]; 
[facility] [phone]; [facility] [phone]; [facility] 
Rehabilitation Program [phone]; [facility] [phone]; [facility] [phone]; [facility] [phone].  SW also provided patient 
with information on locations of pharmacies.  SW also provided the patient with locations of 
the [facility] facilities throughout the [facility] that provide services of showers, 
bathrooms, and locker rooms for the homeless:  [place], [address] [place];  
[place], [address] [facility]; and [facility] [address] [place].  

Patient requested address and phone number for the [facility], and JUAN provided this 
information to the patient:  [address] [place] [address]; [phone].

LARA Ramos also provided patient with sandwiches and clothing.  JUAN provided patient with a 
1-day [employer] pass.  Patient signed the Homeless Patient Waiver Form.  No further SS 
interventions needed at this time.  Patient discharged from the ED with assistance from 
nurse.

## 2020-06-22 NOTE — NUR
ASSSISSTED PT TO BE D/CLARI. CLOTHING AND COUPLE OF SANDWICHES PROVIDED FOR PT. 
PT USED OWN CELL PHONE DURING AM STAY IN ER.

## 2020-06-22 NOTE — NUR
recieved pt in room 5. pt was d/makayla last night but was allowed to stay in room 
5 till morning so  can talk to the pt. pt resting, no sign of 
distress. comfort measures was provided.

-------------------------------------------------------------------------------

Addendum: 06/22/20 at 1147 by YANG

-------------------------------------------------------------------------------

CORRECTION ON TIME: 0810

## 2020-07-13 ENCOUNTER — HOSPITAL ENCOUNTER (INPATIENT)
Dept: HOSPITAL 54 - ER | Age: 55
LOS: 1 days | Discharge: ANOTHER HOSPITAL | DRG: 384 | End: 2020-07-14
Attending: INTERNAL MEDICINE | Admitting: INTERNAL MEDICINE
Payer: COMMERCIAL

## 2020-07-13 VITALS — WEIGHT: 189 LBS | HEIGHT: 70 IN | BODY MASS INDEX: 27.06 KG/M2

## 2020-07-13 VITALS — SYSTOLIC BLOOD PRESSURE: 148 MMHG | DIASTOLIC BLOOD PRESSURE: 98 MMHG

## 2020-07-13 DIAGNOSIS — R53.1: ICD-10-CM

## 2020-07-13 DIAGNOSIS — F14.90: ICD-10-CM

## 2020-07-13 DIAGNOSIS — Z91.19: ICD-10-CM

## 2020-07-13 DIAGNOSIS — Z59.0: ICD-10-CM

## 2020-07-13 DIAGNOSIS — F12.90: ICD-10-CM
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
## 2020-07-13 DIAGNOSIS — I10: ICD-10-CM

## 2020-07-13 DIAGNOSIS — F19.10: ICD-10-CM
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
## 2020-07-13 DIAGNOSIS — S91.105A: Primary | ICD-10-CM

## 2020-07-13 DIAGNOSIS — I69.354: ICD-10-CM

## 2020-07-13 DIAGNOSIS — K50.90: ICD-10-CM

## 2020-07-13 LAB
ALBUMIN SERPL BCP-MCNC: 3.2 G/DL (ref 3.4–5)
ALP SERPL-CCNC: 142 U/L (ref 46–116)
ALT SERPL W P-5'-P-CCNC: 15 U/L (ref 12–78)
APAP SERPL-MCNC: < 2 UG/ML (ref 10–30)
AST SERPL W P-5'-P-CCNC: 11 U/L (ref 15–37)
BASOPHILS # BLD AUTO: 0 /CMM (ref 0–0.2)
BASOPHILS NFR BLD AUTO: 0.4 % (ref 0–2)
BILIRUB DIRECT SERPL-MCNC: 0.1 MG/DL (ref 0–0.2)
BILIRUB SERPL-MCNC: 0.3 MG/DL (ref 0.2–1)
BUN SERPL-MCNC: 7 MG/DL (ref 7–18)
CALCIUM SERPL-MCNC: 8.5 MG/DL (ref 8.5–10.1)
CHLORIDE SERPL-SCNC: 108 MMOL/L (ref 98–107)
CO2 SERPL-SCNC: 32 MMOL/L (ref 21–32)
CREAT SERPL-MCNC: 1 MG/DL (ref 0.6–1.3)
EOSINOPHIL NFR BLD AUTO: 4.2 % (ref 0–6)
ETHANOL SERPL-MCNC: < 3 MG/DL (ref 0–0)
GLUCOSE SERPL-MCNC: 127 MG/DL (ref 74–106)
HCT VFR BLD AUTO: 42 % (ref 39–51)
HGB BLD-MCNC: 14 G/DL (ref 13.5–17.5)
LYMPHOCYTES NFR BLD AUTO: 1.1 /CMM (ref 0.8–4.8)
LYMPHOCYTES NFR BLD AUTO: 23.5 % (ref 20–44)
MCHC RBC AUTO-ENTMCNC: 34 G/DL (ref 31–36)
MCV RBC AUTO: 96 FL (ref 80–96)
MONOCYTES NFR BLD AUTO: 0.5 /CMM (ref 0.1–1.3)
MONOCYTES NFR BLD AUTO: 10.9 % (ref 2–12)
NEUTROPHILS # BLD AUTO: 2.8 /CMM (ref 1.8–8.9)
NEUTROPHILS NFR BLD AUTO: 61 % (ref 43–81)
PH UR STRIP: 5.5 [PH] (ref 5–8)
PLATELET # BLD AUTO: 239 /CMM (ref 150–450)
POTASSIUM SERPL-SCNC: 3.3 MMOL/L (ref 3.5–5.1)
PROT SERPL-MCNC: 6.9 G/DL (ref 6.4–8.2)
RBC # BLD AUTO: 4.35 MIL/UL (ref 4.5–6)
SALICYLATES SERPL-MCNC: 0.7 MG/DL (ref 2.8–20)
SODIUM SERPL-SCNC: 145 MMOL/L (ref 136–145)
UROBILINOGEN UR STRIP-MCNC: 0.2 EU/DL
WBC NRBC COR # BLD AUTO: 4.6 K/UL (ref 4.3–11)

## 2020-07-13 PROCEDURE — G0480 DRUG TEST DEF 1-7 CLASSES: HCPCS

## 2020-07-13 PROCEDURE — G0378 HOSPITAL OBSERVATION PER HR: HCPCS

## 2020-07-13 SDOH — ECONOMIC STABILITY - HOUSING INSECURITY: HOMELESSNESS: Z59.0

## 2020-07-13 NOTE — NUR
ms rn note 



spoke with dr. manley about admiting orders, informed him the ED has already administered 
potassium 40meq. received order to DC potassium. order read back noted and carried out. will 
continue to monitor.

## 2020-07-13 NOTE — NUR
PT BIB RA FROM THE [facility] WITH A C/O LEFT LOWER EXTREMITY PAIN. PT 
IS HOMELESS AND HAS A HX OF CVA WITH LEFT SIDED WEAKNESS. PT IS AA&O X4. PT WAS 
TRIAGED AND TAKEN TO ROOM 12.

## 2020-07-13 NOTE — NUR
GOT A CALL FROM ALYSON ARANDA FROM [facility]. [facility] DOCTOR ON THE LINE FOR PEER TO 
PEER.

## 2020-07-13 NOTE — NUR
PT WAS ASSISTED TO THE BATHROOM VIA WC AND ASSISTED TO THE TOILET. PT WAS NOT 
ABLE TO GIVE A URINE SAMPLE. PT WAS ASSISTED BACK TO THE WC AND THEN TO ER 12. 
PT WAS RECONNECTED TO THE MONITOR AND POX. RESP ARE EVEN AND UNLABORED.

## 2020-07-13 NOTE — NUR
ALYSON,  - [facility], CALLED BACK AND THERE IS NO ADMITTING 
INFORMATION AT THIS TIME. ALYSON WAS TOLD THAT IT WAS BEYOND THE 2 HRS AND 
THE PT WOULD HAVE TO BE ADMITTED HERE. ALYSON INSTRUCTED US TO CALL DR KOENIG.

## 2020-07-13 NOTE — NUR
SPOKE TO ALYSON,  FOR [facility], RE: PT UPDATE AND CLINICALS SHE CAN 
BE REACHED [phone]. SHE IS TRYING TO GET THE PT INTO A SKILLED NURSING 
FACILITY. ALYSON STATED THAT IT WILL TAKE AN HOUR OR TWO TO GET A FACILITY 
AND IF WE DO NOT HAVE PLACEMENT IN 2 HOURS WE WILL ADMIT THE PT HERE. ALYSON 
IS AWARE AND AGREED.

## 2020-07-13 NOTE — NUR
ms rn note 



called [facility]. they are currently full and have no beds available.

## 2020-07-13 NOTE — NUR
PT REC'D A URINAL FOR URINE SAMPLE. SITTER IS ASSISTING PT WITH URINAL. PT 
REQUESTED WATER AND REC'D A CUP OF WATER.

## 2020-07-13 NOTE — NUR
PT IS STILL UNABLE TO GIVE A URINE SAMPLE. PT STATED THAT HE NEEDS MORE WATER. 
PT REC'D MORE WATER.

## 2020-07-13 NOTE — NUR
ms rn admission note 



received patient via gurney. transferred to bed. a/ox4. tolerating room air. respirations 
are even and unlabored. no s/s sob noted. no c/o pain at this time. in no apparent distress. 
no iv access noted. belongings list reassessed by cna, vital signs obtained by cna. home 
medications taken and will be given to pharmacy in AM. patient refused skin assessment.  bed 
is low and locked, hob flat, side rials up x2. call light within reach. will continue to 
monitor.

## 2020-07-13 NOTE — NUR
BRIAN CONSULT:

 conducted a  consult to address the pt's homeless status. Pt 
was irritable with SW and selectively answered questions. Pt reported he was recently 
discharged from [facility] and went on to stay at a [facility]. Pt reported he 
has run out of funds at this time and can no longer pay for his hotel room. Pt reported he 
receives SSI at $800/month, and his next payment date is 08/01/2020. Pt reported he 
currently uses cocaine and Oxycodone recreationally. Pt reported the last time he used was 
two days ago. Pt reported he attends AA meetings. Pt declined drug/alcohol resources at this 
time. Pt denied suicidal or homicidal ideation. Pt denied auditory or visual hallucinations. 


Pt is requesting SW to place him in an independent living today as he cannot do it himself. 
BRIAN attempted to offer the pt additional resources, but the pt declined and yelled at BRIAN 
stating he "already has all that." Pt also requested that BRIAN contact his sister, Wyatt 
([phone]) to inform her of his hospitalization, and ask for help on his behalf. BRIAN 
contacted pt's sister, who reported the pt has been in and out of facilities, rehab centers, 
motels, and is non-compliant with any treatment and refuses all interventions. Wyatt 
reported the pt will reach out for help once he has run out of funds. Wyatt reported she is 
not willing or able to take the pt into her home, or help him financially. Wyatt reported 
she will contact their father to inform him of the pt's status as well. Per pt's nurse, 
Luisa, the pt cannot discharge without a wheelchair. , Dhara 
contacted DEENA Lee regarding this request. Per Jesus Jules declined to provide a 
wheelchair for the pt at this time. 





 available for support as needed. 

-------------------------------------------------------------------------------

Addendum: 07/13/20 at 1652 by FAROOQ TORRES

-------------------------------------------------------------------------------

BRIAN consulted with , Dhara regarding this case, and the pt's Nurse, 
Luisa. Per Luisa, the pt will be admitted to the hospital to address medical issues. Per 
Dhara, once the pt is medically cleared to discharge, Case Management can search for an 
appropriate placement to ensure a safe discharge.

## 2020-07-13 NOTE — NUR
LATE ENTRY:

AS PER PT, THE FOLLOWING FAMILY CAN BE REACHED AT THE CORRESPONDING PHONE 
NUMBERS. 



PEARL (FATHER) [phone]



HUMBLE (SISTER) [phone]



MARIANA (BROTHER) [phone]

## 2020-07-14 VITALS — SYSTOLIC BLOOD PRESSURE: 116 MMHG | DIASTOLIC BLOOD PRESSURE: 71 MMHG

## 2020-07-14 LAB
BUN SERPL-MCNC: 9 MG/DL (ref 7–18)
CALCIUM SERPL-MCNC: 8.6 MG/DL (ref 8.5–10.1)
CHLORIDE SERPL-SCNC: 105 MMOL/L (ref 98–107)
CHOLEST SERPL-MCNC: 104 MG/DL (ref ?–200)
CO2 SERPL-SCNC: 33 MMOL/L (ref 21–32)
CREAT SERPL-MCNC: 0.9 MG/DL (ref 0.6–1.3)
GLUCOSE SERPL-MCNC: 90 MG/DL (ref 74–106)
HDLC SERPL-MCNC: 38 MG/DL (ref 40–60)
LDLC SERPL DIRECT ASSAY-MCNC: 44 MG/DL (ref 0–99)
POTASSIUM SERPL-SCNC: 3.8 MMOL/L (ref 3.5–5.1)
SODIUM SERPL-SCNC: 141 MMOL/L (ref 136–145)
TRIGL SERPL-MCNC: 178 MG/DL (ref 30–150)

## 2020-07-14 RX ADMIN — Medication PRN EACH: at 02:12

## 2020-07-14 RX ADMIN — HYDROMORPHONE HYDROCHLORIDE PRN MG: 1 INJECTION, SOLUTION INTRAMUSCULAR; INTRAVENOUS; SUBCUTANEOUS at 03:49

## 2020-07-14 RX ADMIN — HYDROMORPHONE HYDROCHLORIDE PRN MG: 1 INJECTION, SOLUTION INTRAMUSCULAR; INTRAVENOUS; SUBCUTANEOUS at 08:22

## 2020-07-14 RX ADMIN — Medication PRN EACH: at 11:18

## 2020-07-14 RX ADMIN — Medication PRN EACH: at 06:35

## 2020-07-14 NOTE — NUR
ms rn note 



administered prn norco 5/325 for pain 7/10 generalized in back and toes. will continue to 
monitor.

## 2020-07-14 NOTE — NUR
RN NOTES



REPORTS GIVEN TO EMILY MARTINEZ FROM [facility].  PATIENT IS GOING -B.

## 2020-07-14 NOTE — NUR
ms rn note 



called dr. manley to inform him the pain medication norco 5/325 is not working for the 
patient. patient is currently screaming in pain. MD telephone order Dilaudid 1mg q4hr prn 
and Zofran 4mg q4hr prn. order read back, noted and carried out. will continue to monitor.

## 2020-07-14 NOTE — NUR
ms rn closing note 



patient in bed. a/ox4. remains tolerating room air. respirations are even and unlabored. no 
sob noted. managed pain with norco and Dilaudid. no distress. iv access in rfa #20 patent 
and saline locked. will take home medications pharmacy this AM   bed is low and locked, hob 
flat, side rials up x3. call light within reach. will endorse to next shift

## 2020-07-14 NOTE — NUR
ms rn note 



called Dr. smith paging service to inform him that there are no beds available at [facility] at this time and to have an admit to order. md telephone order admit to order for 
leg pain. also asked if he would like to order any DVT prophylaxis, md telephone order dvt 
pumps and lovenox 40mg SQ Qdaily. orders read back, noted, and carried out.

## 2020-07-14 NOTE — NUR
MS RN NOTES



MD/LIBERTY INTRUCTED RN TO DISCONTINUE CLONAZEPAM UNDER HOME MEDS OF PT. WILL NOTIFY PRIMARY 
NURSE/DARYL.

## 2020-07-14 NOTE — NUR
RN NOTES



Received patient in bed resting comfortably in moderate high back rest. A/O x4. On RA, no 
signs of distress noted at this time. IV access in rfa #20 patent and saline locked. Safety 
measures in place, bed is low and locked, side rials up x3. call light within reach. will 
continue to monitor.

## 2020-07-14 NOTE — NUR
ms rn note 



tried to apply dvt pumps, patient refused d/t leg pain. explained risk and benefits, patient 
continues to refuse.

## 2020-07-14 NOTE — NUR
RN DISCHARGE NOTES



PATIENT DISCHARGED IN STABLE CONDITION. A/O X4. ABLE TO MAKE NEEDS KNOWN. V/S TAKEN , STABLE 
AND RECORDED. REFUSED SKIN ASSESSMENT AND PICTURES. NAME ARM BAND REMOVED. PATIENT DISCHARGE 
WITH IV ACCESS ON RFA #20, SL. ALL BELONGINGS CHECKED AND SIGNED, HOME MEDICATION GIVEN TO 
PATIENT. HEALTH TEACHINGS/DISCHARGE INSTRUCTIONS GIVEN TO PATIENT AND VERBALIZED 
UNDERSTANDING. REPORTS GIVEN TO MICHELLE(RN FROM [facility]), PATIENT WILL BE 
GOING TO ROOM 302-B. PATIENT LEFT UNIT VIA GURNEY, ACCOMPANIED BY 3 AMBULANCE STAFF WITH NO 
ACUTE SIGNS OF DISTRESS. CHARGE NURSE AWARE OF DISCHARGED.

## 2020-07-14 NOTE — NUR
RN NOTES



RECEIVED A CALL FROM MERNA, SPOKE TO ALYSON, SHE SAID THERE IS A BED NOW AT [facility], PATIENT WILL BE  @11:45. DR. KOENIG MADE AWARE AND PER  TO 
TRANSFER PATIENT TO [facility].

## 2020-09-02 ENCOUNTER — HOSPITAL ENCOUNTER (EMERGENCY)
Dept: HOSPITAL 12 - ER | Age: 55
Discharge: HOME | End: 2020-09-02
Payer: COMMERCIAL

## 2020-09-02 VITALS — DIASTOLIC BLOOD PRESSURE: 71 MMHG | SYSTOLIC BLOOD PRESSURE: 136 MMHG

## 2020-09-02 VITALS — HEIGHT: 70 IN | BODY MASS INDEX: 25.05 KG/M2 | WEIGHT: 175 LBS

## 2020-09-02 DIAGNOSIS — I69.354: ICD-10-CM

## 2020-09-02 DIAGNOSIS — M77.32: ICD-10-CM

## 2020-09-02 DIAGNOSIS — Z59.0: ICD-10-CM

## 2020-09-02 DIAGNOSIS — S93.402A: Primary | ICD-10-CM

## 2020-09-02 DIAGNOSIS — I10: ICD-10-CM
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
## 2020-09-02 DIAGNOSIS — F41.9: ICD-10-CM

## 2020-09-02 DIAGNOSIS — Z79.899: ICD-10-CM

## 2020-09-02 DIAGNOSIS — Z91.041: ICD-10-CM

## 2020-09-02 PROCEDURE — 73610 X-RAY EXAM OF ANKLE: CPT

## 2020-09-02 PROCEDURE — 96372 THER/PROPH/DIAG INJ SC/IM: CPT

## 2020-09-02 PROCEDURE — A4663 DIALYSIS BLOOD PRESSURE CUFF: HCPCS

## 2020-09-02 PROCEDURE — 99284 EMERGENCY DEPT VISIT MOD MDM: CPT

## 2020-09-02 PROCEDURE — 73630 X-RAY EXAM OF FOOT: CPT

## 2020-09-02 SDOH — ECONOMIC STABILITY - HOUSING INSECURITY: HOMELESSNESS: Z59.0

## 2020-09-02 NOTE — NUR
Patient given written and verbal discharge instructions.  Patient verbalizes 
understanding of instructions.  Patient able to ambulate from gurney to 
wheelchair. Patient able to get around independently.  Refuses offer of shelter 
placement.  Patient given list of available shelters in surrounding area. NAD 
noted

## 2021-06-10 NOTE — NUR
Patient discharged to home in stable condition.  Written and verbal after care 
instructions given. Pt refused to sign discharge paper work. Pt left ER w/ own 
wheelchair yelling profanities toward hospital/Er staff.

## 2021-06-22 NOTE — NUR
305 IS THE UNIT WHO WILL TRANSPORT THE PT PER ZELDA AT [facility]. UNIT WILL BE HERE 
WITHIN THE HOUR.